# Patient Record
Sex: FEMALE | Race: ASIAN | NOT HISPANIC OR LATINO | Employment: FULL TIME | URBAN - METROPOLITAN AREA
[De-identification: names, ages, dates, MRNs, and addresses within clinical notes are randomized per-mention and may not be internally consistent; named-entity substitution may affect disease eponyms.]

---

## 2022-08-15 DIAGNOSIS — Z32.01 POSITIVE PREGNANCY TEST: Primary | ICD-10-CM

## 2022-12-07 ENCOUNTER — APPOINTMENT (EMERGENCY)
Dept: RADIOLOGY | Facility: HOSPITAL | Age: 32
End: 2022-12-07

## 2022-12-07 ENCOUNTER — HOSPITAL ENCOUNTER (EMERGENCY)
Facility: HOSPITAL | Age: 32
Discharge: HOME | End: 2022-12-07
Attending: EMERGENCY MEDICINE

## 2022-12-07 VITALS
SYSTOLIC BLOOD PRESSURE: 99 MMHG | DIASTOLIC BLOOD PRESSURE: 65 MMHG | RESPIRATION RATE: 16 BRPM | HEART RATE: 72 BPM | TEMPERATURE: 98 F | OXYGEN SATURATION: 99 %

## 2022-12-07 DIAGNOSIS — K52.9 COLITIS: ICD-10-CM

## 2022-12-07 DIAGNOSIS — R93.89 INCREASED ENDOMETRIAL STRIPE THICKNESS: ICD-10-CM

## 2022-12-07 DIAGNOSIS — R10.9 ABDOMINAL PAIN: Primary | ICD-10-CM

## 2022-12-07 LAB
ALBUMIN SERPL BCP-MCNC: 3.8 G/DL (ref 3.5–5)
ALP SERPL-CCNC: 49 U/L (ref 46–116)
ALT SERPL W P-5'-P-CCNC: 16 U/L (ref 12–78)
ANION GAP SERPL CALCULATED.3IONS-SCNC: 6 MMOL/L (ref 4–13)
AST SERPL W P-5'-P-CCNC: 16 U/L (ref 5–45)
BACTERIA UR QL AUTO: ABNORMAL /HPF
BASOPHILS # BLD AUTO: 0.02 THOUSANDS/ÂΜL (ref 0–0.1)
BASOPHILS NFR BLD AUTO: 0 % (ref 0–1)
BILIRUB SERPL-MCNC: 0.81 MG/DL (ref 0.2–1)
BILIRUB UR QL STRIP: NEGATIVE
BUN SERPL-MCNC: 16 MG/DL (ref 5–25)
CALCIUM SERPL-MCNC: 8.9 MG/DL (ref 8.3–10.1)
CHLORIDE SERPL-SCNC: 109 MMOL/L (ref 96–108)
CLARITY UR: CLEAR
CO2 SERPL-SCNC: 23 MMOL/L (ref 21–32)
COLOR UR: YELLOW
CREAT SERPL-MCNC: 0.71 MG/DL (ref 0.6–1.3)
EOSINOPHIL # BLD AUTO: 0.04 THOUSAND/ÂΜL (ref 0–0.61)
EOSINOPHIL NFR BLD AUTO: 0 % (ref 0–6)
ERYTHROCYTE [DISTWIDTH] IN BLOOD BY AUTOMATED COUNT: 12.1 % (ref 11.6–15.1)
EXT PREGNANCY TEST URINE: NEGATIVE
EXT. CONTROL: NORMAL
GFR SERPL CREATININE-BSD FRML MDRD: 113 ML/MIN/1.73SQ M
GLUCOSE SERPL-MCNC: 95 MG/DL (ref 65–140)
GLUCOSE UR STRIP-MCNC: NEGATIVE MG/DL
HCT VFR BLD AUTO: 40.7 % (ref 34.8–46.1)
HGB BLD-MCNC: 13.5 G/DL (ref 11.5–15.4)
HGB UR QL STRIP.AUTO: ABNORMAL
IMM GRANULOCYTES # BLD AUTO: 0.05 THOUSAND/UL (ref 0–0.2)
IMM GRANULOCYTES NFR BLD AUTO: 0 % (ref 0–2)
KETONES UR STRIP-MCNC: ABNORMAL MG/DL
LEUKOCYTE ESTERASE UR QL STRIP: NEGATIVE
LIPASE SERPL-CCNC: 78 U/L (ref 73–393)
LYMPHOCYTES # BLD AUTO: 1.18 THOUSANDS/ÂΜL (ref 0.6–4.47)
LYMPHOCYTES NFR BLD AUTO: 10 % (ref 14–44)
MCH RBC QN AUTO: 30 PG (ref 26.8–34.3)
MCHC RBC AUTO-ENTMCNC: 33.2 G/DL (ref 31.4–37.4)
MCV RBC AUTO: 90 FL (ref 82–98)
MONOCYTES # BLD AUTO: 1.28 THOUSAND/ÂΜL (ref 0.17–1.22)
MONOCYTES NFR BLD AUTO: 11 % (ref 4–12)
MUCOUS THREADS UR QL AUTO: ABNORMAL
NEUTROPHILS # BLD AUTO: 9.38 THOUSANDS/ÂΜL (ref 1.85–7.62)
NEUTS SEG NFR BLD AUTO: 79 % (ref 43–75)
NITRITE UR QL STRIP: NEGATIVE
NON-SQ EPI CELLS URNS QL MICRO: ABNORMAL /HPF
NRBC BLD AUTO-RTO: 0 /100 WBCS
PH UR STRIP.AUTO: 5.5 [PH]
PLATELET # BLD AUTO: 191 THOUSANDS/UL (ref 149–390)
PMV BLD AUTO: 9.6 FL (ref 8.9–12.7)
POTASSIUM SERPL-SCNC: 3.4 MMOL/L (ref 3.5–5.3)
PROT SERPL-MCNC: 7.9 G/DL (ref 6.4–8.4)
PROT UR STRIP-MCNC: ABNORMAL MG/DL
RBC # BLD AUTO: 4.5 MILLION/UL (ref 3.81–5.12)
RBC #/AREA URNS AUTO: ABNORMAL /HPF
SODIUM SERPL-SCNC: 138 MMOL/L (ref 135–147)
SP GR UR STRIP.AUTO: 1.03 (ref 1–1.03)
UROBILINOGEN UR STRIP-ACNC: <2 MG/DL
WBC # BLD AUTO: 11.95 THOUSAND/UL (ref 4.31–10.16)
WBC #/AREA URNS AUTO: ABNORMAL /HPF

## 2022-12-07 RX ORDER — KETOROLAC TROMETHAMINE 30 MG/ML
15 INJECTION, SOLUTION INTRAMUSCULAR; INTRAVENOUS ONCE
Status: COMPLETED | OUTPATIENT
Start: 2022-12-07 | End: 2022-12-07

## 2022-12-07 RX ORDER — OXYCODONE HYDROCHLORIDE 5 MG/1
5 TABLET ORAL EVERY 6 HOURS PRN
Qty: 5 TABLET | Refills: 0 | Status: SHIPPED | OUTPATIENT
Start: 2022-12-07 | End: 2022-12-17

## 2022-12-07 RX ORDER — MORPHINE SULFATE 4 MG/ML
4 INJECTION, SOLUTION INTRAMUSCULAR; INTRAVENOUS ONCE
Status: DISCONTINUED | OUTPATIENT
Start: 2022-12-07 | End: 2022-12-07

## 2022-12-07 RX ORDER — ACETAMINOPHEN 325 MG/1
975 TABLET ORAL ONCE
Status: DISCONTINUED | OUTPATIENT
Start: 2022-12-07 | End: 2022-12-07

## 2022-12-07 RX ADMIN — IOHEXOL 85 ML: 350 INJECTION, SOLUTION INTRAVENOUS at 06:22

## 2022-12-07 RX ADMIN — KETOROLAC TROMETHAMINE 15 MG: 30 INJECTION, SOLUTION INTRAMUSCULAR at 05:47

## 2022-12-07 RX ADMIN — SODIUM CHLORIDE, SODIUM LACTATE, POTASSIUM CHLORIDE, AND CALCIUM CHLORIDE 1000 ML: .6; .31; .03; .02 INJECTION, SOLUTION INTRAVENOUS at 05:37

## 2022-12-07 NOTE — Clinical Note
Ashutosh Shin was seen and treated in our emergency department on 12/7/2022  No restrictions        No limitations    Diagnosis: Colitis    Reather Seed  may return to work on return date  She may return on this date: 12/12/2022         If you have any questions or concerns, please don't hesitate to call        Chel Vazquez MD    ______________________________           _______________          _______________  [facility] [place] Representative                              Date                                Time

## 2022-12-07 NOTE — Clinical Note
Ashutosh Shin was seen and treated in our emergency department on 12/7/2022  No restrictions        No limitations    Diagnosis: Colitis    Reather Seed  may return to work on return date  She may return on this date: 12/12/2022         If you have any questions or concerns, please don't hesitate to call        Chel Vazquez MD    ______________________________           _______________          _______________  [facility] [place] Representative                              Date                                Time

## 2022-12-07 NOTE — ED PROVIDER NOTES
History  Chief Complaint   Patient presents with   • Abdominal Pain     Pt started with RLQ pain around midnight, denies any N/V/D     78-year-old woman with no past medical history presents with right lower quadrant abdominal pain  Symptoms started 28 hours ago and have progressively worsened  This evening, patient was unable to get any sleep  She denies nausea, vomiting, chest pain, dyspnea, dysuria, or diarrhea  Last menstrual period was a few weeks ago and regular  Patient has never had abdominal surgery  No personal or family history of inflammatory bowel disease  Past medical history: None  Past surgical history: None  Medications: None          None       History reviewed  No pertinent past medical history  History reviewed  No pertinent surgical history  History reviewed  No pertinent family history  I have reviewed and agree with the history as documented  E-Cigarette/Vaping     E-Cigarette/Vaping Substances           Review of Systems   Constitutional: Negative for chills and fever  HENT: Negative for ear pain and sore throat  Eyes: Negative for pain and visual disturbance  Respiratory: Negative for cough, shortness of breath and wheezing  Cardiovascular: Negative for chest pain and palpitations  Gastrointestinal: Positive for abdominal pain  Negative for constipation, diarrhea and vomiting  Genitourinary: Negative for dysuria, frequency, hematuria and vaginal bleeding  Musculoskeletal: Negative for arthralgias and back pain  Skin: Negative for color change and rash  Neurological: Negative for seizures, syncope and headaches  Psychiatric/Behavioral: Negative for agitation and confusion         Physical Exam  ED Triage Vitals   Temperature Pulse Respirations Blood Pressure SpO2   12/07/22 0519 12/07/22 0518 12/07/22 0518 12/07/22 0518 12/07/22 0518   98 °F (36 7 °C) 97 18 109/60 98 %      Temp Source Heart Rate Source Patient Position - Orthostatic VS BP Location FiO2 (%)   12/07/22 0519 12/07/22 0518 12/07/22 0700 12/07/22 0700 --   Oral Monitor Sitting Right arm       Pain Score       12/07/22 0547       5             Orthostatic Vital Signs  Vitals:    12/07/22 0518 12/07/22 0700   BP: 109/60 99/65   Pulse: 97 72   Patient Position - Orthostatic VS:  Sitting       Physical Exam  Vitals and nursing note reviewed  Constitutional:       General: She is not in acute distress  Appearance: Normal appearance  She is well-developed  HENT:      Head: Normocephalic and atraumatic  Right Ear: External ear normal       Left Ear: External ear normal       Nose: Nose normal  No congestion  Cardiovascular:      Rate and Rhythm: Normal rate and regular rhythm  Pulmonary:      Effort: Pulmonary effort is normal  No respiratory distress  Breath sounds: Normal breath sounds  Abdominal:      Palpations: Abdomen is soft  Tenderness: There is abdominal tenderness in the right lower quadrant  There is rebound  There is no guarding  Musculoskeletal:         General: Normal range of motion  Cervical back: Normal range of motion and neck supple  Skin:     General: Skin is warm and dry  Neurological:      General: No focal deficit present  Mental Status: She is alert and oriented to person, place, and time  Sensory: No sensory deficit  Motor: No weakness     Psychiatric:         Mood and Affect: Mood normal          Behavior: Behavior normal          ED Medications  Medications   lactated ringers bolus 1,000 mL (0 mL Intravenous Stopped 12/7/22 0637)   ketorolac (TORADOL) injection 15 mg (15 mg Intravenous Given 12/7/22 0547)   iohexol (OMNIPAQUE) 350 MG/ML injection (SINGLE-DOSE) 85 mL (85 mL Intravenous Given 12/7/22 0622)       Diagnostic Studies  Results Reviewed     Procedure Component Value Units Date/Time    Urine Microscopic [[id number]]  (Abnormal) Collected: 12/07/22 0612    Lab Status: Final result Specimen: Urine, Clean Catch Updated: 12/07/22 0743     RBC, UA 1-2 /hpf      WBC, UA 1-2 /hpf      Epithelial Cells Occasional /hpf      Bacteria, UA Moderate /hpf      MUCUS THREADS Innumerable    UA w Reflex to Microscopic w Reflex to Culture [[id number]]  (Abnormal) Collected: 12/07/22 0612    Lab Status: Final result Specimen: Urine, Clean Catch Updated: 12/07/22 0736     Color, UA Yellow     Clarity, UA Clear     Specific Gravity, UA 1 031     pH, UA 5 5     Leukocytes, UA Negative     Nitrite, UA Negative     Protein, UA Trace mg/dl      Glucose, UA Negative mg/dl      Ketones, UA 60 (2+) mg/dl      Urobilinogen, UA <2 0 mg/dl      Bilirubin, UA Negative     Occult Blood, UA Trace    Comprehensive metabolic panel [[id number]]  (Abnormal) Collected: 12/07/22 0538    Lab Status: Final result Specimen: Blood from Arm, Left Updated: 12/07/22 0612     Sodium 138 mmol/L      Potassium 3 4 mmol/L      Chloride 109 mmol/L      CO2 23 mmol/L      ANION GAP 6 mmol/L      BUN 16 mg/dL      Creatinine 0 71 mg/dL      Glucose 95 mg/dL      Calcium 8 9 mg/dL      AST 16 U/L      ALT 16 U/L      Alkaline Phosphatase 49 U/L      Total Protein 7 9 g/dL      Albumin 3 8 g/dL      Total Bilirubin 0 81 mg/dL      eGFR 113 ml/min/1 73sq m     Narrative:      [place] guidelines for Chronic Kidney Disease (CKD):   •  Stage 1 with normal or high GFR (GFR > 90 mL/min/1 73 square meters)  •  Stage 2 Mild CKD (GFR = 60-89 mL/min/1 73 square meters)  •  Stage 3A Moderate CKD (GFR = 45-59 mL/min/1 73 square meters)  •  Stage 3B Moderate CKD (GFR = 30-44 mL/min/1 73 square meters)  •  Stage 4 Severe CKD (GFR = 15-29 mL/min/1 73 square meters)  •  Stage 5 End Stage CKD (GFR <15 mL/min/1 73 square meters)  Note: GFR calculation is accurate only with a steady state creatinine    Lipase [[id number]]  (Normal) Collected: 12/07/22 0538    Lab Status: Final result Specimen: Blood from Arm, Left Updated: 12/07/22 0612     Lipase 78 u/L     CBC and differential [[id number]]  (Abnormal) Collected: 12/07/22 0538    Lab Status: Final result Specimen: Blood from Arm, Left Updated: 12/07/22 0551     WBC 11 95 Thousand/uL      RBC 4 50 Million/uL      Hemoglobin 13 5 g/dL      Hematocrit 40 7 %      MCV 90 fL      MCH 30 0 pg      MCHC 33 2 g/dL      RDW 12 1 %      MPV 9 6 fL      Platelets 552 Thousands/uL      nRBC 0 /100 WBCs      Neutrophils Relative 79 %      Immat GRANS % 0 %      Lymphocytes Relative 10 %      Monocytes Relative 11 %      Eosinophils Relative 0 %      Basophils Relative 0 %      Neutrophils Absolute 9 38 Thousands/µL      Immature Grans Absolute 0 05 Thousand/uL      Lymphocytes Absolute 1 18 Thousands/µL      Monocytes Absolute 1 28 Thousand/µL      Eosinophils Absolute 0 04 Thousand/µL      Basophils Absolute 0 02 Thousands/µL     POCT pregnancy, urine [[id number]]  (Normal) Resulted: 12/07/22 0548    Lab Status: Final result Updated: 12/07/22 0549     EXT Preg Test, Ur Negative     Control Valid                 CT abdomen pelvis with contrast   Final Result by Georgia Walker MD (12/07 4940)      Extensive thickening and inflammatory changes involving the ascending colon through the hepatic flexure, likely representing infectious or inflammatory colitis, although a diverticulum in the ascending colon raises the of lesser possibility of    diverticulitis  No perforation, abscess or obstruction  Appendix is normally visualized  Abnormal endometrial thickening noted  Recommend follow-up nonemergent ultrasound  The study was marked in [facility] for immediate notification and follow-up  Workstation performed: [id number]               Procedures  Procedures      ED Course         SBIRT 22yo+    Flowsheet Row Most Recent Value   SBIRT (23 yo +)    In order to provide better care to our patients, we are screening all of our patients for alcohol and drug use  Would it be okay to ask you these screening questions?  Yes Filed at: 12/07/2022 1714 Initial Alcohol Screen: US AUDIT-C     1  How often do you have a drink containing alcohol? 2 Filed at: 12/07/2022 0526   2  How many drinks containing alcohol do you have on a typical day you are drinking? 0 Filed at: 12/07/2022 0526   3a  Male UNDER 65: How often do you have five or more drinks on one occasion? 0 Filed at: 12/07/2022 0526   3b  FEMALE Any Age, or MALE 65+: How often do you have 4 or more drinks on one occassion? 0 Filed at: 12/07/2022 0526   Audit-C Score 2 Filed at: 12/07/2022 9546   HAR[place]: How many times in the past year have you    Used an illegal drug or used a prescription medication for non-medical reasons? Never Filed at: 12/07/2022 7457                MDM  Number of Diagnoses or Management Options  Abdominal pain  Colitis  Increased endometrial stripe thickness  Diagnosis management comments: Presents with 1 day of worsening right lower abdominal pain without vomiting or diarrhea  CT scan suggestive of infectious vs inflammatory colitis without evidence of appendicitis  Patient able to tolerate PO and reported improvement of her abdominal pain prior to discharge  Discussed with on-call GI who recommend outpatient stool studies and follow up outpatient  Informed patient of increased endometrial stripe thickness and need to follow up with OBGYN  Oxycodone sent to pharmacy  Patient in agreement with plan and questions were answered  Verbalized understanding of return precautions  Portions or all of this note were generated using voice recognition software  Occasional wrong word or "sound a like" substitutions may have occurred due to the inherent limitations of voice recognition software  Please interpret any errors within the intended context of the whole sentence or idea        Disposition  Final diagnoses:   Abdominal pain   Colitis   Increased endometrial stripe thickness     Time reflects when diagnosis was documented in both MDM as applicable and the Disposition within this note     Time User Action Codes Description Comment    12/7/2022  7:10 AM Kee Gobble Add [R10 9] Abdominal pain     12/7/2022  7:12 AM Laverda Ped Add [K52 9] Colitis     12/7/2022  7:16 AM Laverda Ped Add [R93 89] Increased endometrial stripe thickness       ED Disposition     ED Disposition   Discharge    Condition   Stable    Date/Time   Wed Dec 7, 2022  7:12 AM    Comment   Casi Blum discharge to home/self care  Follow-up Information     Follow up With Specialties Details Why Contact Info Additional [facility]   [address] [facility] [address] [place] [id number] [phone] Darinel Roberto [facility], [address] [place] [address] [address] [place] , [place], [place], [address]    [facility]st[facility] Gyne[facility]   [address]  [phone] [facility], [address] [place] [address], [id number] [phone]          Discharge Medication List as of 12/7/2022  7:26 AM      START taking these medications    Details   oxyCODONE (Roxicodone) 5 immediate release tablet Take 1 tablet (5 mg total) by mouth every 6 (six) hours as needed for severe pain for up to 10 days Max Daily Amount: 20 mg, Starting Wed 12/7/2022, Until Sat 12/17/2022 at 2359, Normal           Outpatient Discharge Orders   Stool Enteric Bacterial Panel by PCR   Standing Status: Future Standing Exp  Date: 12/07/23       PDMP Review     None           ED Provider  Attending physically available and evaluated Casi Blum I managed the patient along with the ED Attending      Electronically Signed by         Giulia Soto MD  12/07/22 2431

## 2022-12-07 NOTE — ED ATTENDING ATTESTATION
2022  IVitaly MD, saw and evaluated the patient  I have discussed the patient with the resident/non-physician practitioner and agree with the resident's/non-physician practitioner's findings, Plan of Care, and MDM as documented in the resident's/non-physician practitioner's note, except where noted  All available labs and Radiology studies were reviewed  I was present for key portions of any procedure(s) performed by the resident/non-physician practitioner and I was immediately available to provide assistance  At this point I agree with the current assessment done in the Emergency Department  I have conducted an independent evaluation of this patient a history and physical is as follows:    ED Course  ED Course as of 22 0650   Wed Dec 07, 2022   0529 Per resident h&p with R sided abdominal pain over the last 24 hours O: RLQ tender I/P UA; urine hCG; CT scan abdomen and pelvis     Emergency Department Note- Golden Wan 33 y o  female MRN: [id number]    Unit/Bed#: ED 24 Encounter: [id number]    Golden Wan is a 33 y o  female who presents with   Chief Complaint   Patient presents with   • Abdominal Pain     Pt started with RLQ pain around midnight, denies any N/V/D         History of Present Illness   HPI:  Golden Wan is a 33 y o  female who presents for evaluation of:  Right lower quadrant abdominal pain that started abruptly at midnight and has persisted until arrival in the emergency department  Pain is an aching sensation extending from her right lower quadrant of her abdomen to her right upper quadrant  The pain is an aching sensation, moderate to severe in intensity, made worse by movement, not palliated by rest   The patient has no history of prior surgeries  She is a  0 para 0; her last menstrual period was sometime last month  She denies associated nausea and vomiting  She also denies any associated diarrhea and hematochezia      Review of Systems Constitutional: Negative for fatigue and fever  HENT: Negative for congestion and sore throat  Respiratory: Negative for cough and shortness of breath  Cardiovascular: Negative for chest pain and palpitations  Gastrointestinal: Positive for abdominal pain  Negative for blood in stool and nausea  Genitourinary: Negative for flank pain and frequency  Neurological: Negative for light-headedness and headaches  Psychiatric/Behavioral: Negative for dysphoric mood and hallucinations  All other systems reviewed and are negative  Historical Information   History reviewed  No pertinent past medical history  History reviewed  No pertinent surgical history  Social History   Social History     Substance and Sexual Activity   Alcohol Use None     Social History     Substance and Sexual Activity   Drug Use Not on file     Social History     Tobacco Use   Smoking Status Not on file   Smokeless Tobacco Not on file     Family History: History reviewed  No pertinent family history  Meds/Allergies   PTA meds:   None     No Known Allergies    Objective   First Vitals:   Blood Pressure: 109/60 (12/07/22 0518)  Pulse: 97 (12/07/22 0518)  Temperature: 98 °F (36 7 °C) (12/07/22 0519)  Temp Source: Oral (12/07/22 0519)  Respirations: 18 (12/07/22 0518)  SpO2: 98 % (12/07/22 0518)    Current Vitals:   Blood Pressure: 109/60 (12/07/22 0518)  Pulse: 97 (12/07/22 0518)  Temperature: 98 °F (36 7 °C) (12/07/22 0519)  Temp Source: Oral (12/07/22 0519)  Respirations: 18 (12/07/22 0518)  SpO2: 98 % (12/07/22 0518)    No intake or output data in the 24 hours ending 12/07/22 0650    Invasive Devices     Peripheral Intravenous Line  Duration           Peripheral IV 12/07/22 Left Antecubital <1 day                Physical Exam  Vitals and nursing note reviewed  Constitutional:       General: She is not in acute distress  Appearance: Normal appearance  She is well-developed     HENT:      Head: Normocephalic and atraumatic  Right Ear: External ear normal       Left Ear: External ear normal       Nose: Nose normal       Mouth/Throat:      Pharynx: No oropharyngeal exudate  Eyes:      Conjunctiva/sclera: Conjunctivae normal       Pupils: Pupils are equal, round, and reactive to light  Cardiovascular:      Rate and Rhythm: Normal rate and regular rhythm  Pulmonary:      Effort: Pulmonary effort is normal  No respiratory distress  Abdominal:      General: Abdomen is flat  There is no distension  Palpations: Abdomen is soft  Tenderness: There is abdominal tenderness in the right upper quadrant and right lower quadrant  Musculoskeletal:         General: No deformity  Normal range of motion  Cervical back: Normal range of motion and neck supple  Skin:     General: Skin is warm and dry  Capillary Refill: Capillary refill takes less than 2 seconds  Neurological:      General: No focal deficit present  Mental Status: She is alert and oriented to person, place, and time  Mental status is at baseline  Coordination: Coordination normal    Psychiatric:         Mood and Affect: Mood normal          Behavior: Behavior normal          Thought Content: Thought content normal          Judgment: Judgment normal            Medical Decision Makin    Acute abdominal pain: CT scan abdomen and pelvis; CBC; CMP; urinalysis; urine for hCG    Recent Results (from the past 36 hour(s))   CBC and differential    Collection Time: 22  5:38 AM   Result Value Ref Range    WBC 11 95 (H) 4 31 - 10 16 Thousand/uL    RBC 4 50 3 81 - 5 12 Million/uL    Hemoglobin 13 5 11 5 - 15 4 g/dL    Hematocrit 40 7 34 8 - 46 1 %    MCV 90 82 - 98 fL    MCH 30 0 26 8 - 34 3 pg    MCHC 33 2 31 4 - 37 4 g/dL    RDW 12 1 11 6 - 15 1 %    MPV 9 6 8 9 - 12 7 fL    Platelets 011 352 - 864 Thousands/uL    nRBC 0 /100 WBCs    Neutrophils Relative 79 (H) 43 - 75 %    Immat GRANS % 0 0 - 2 %    Lymphocytes Relative 10 (L) 14 - 44 %    Monocytes Relative 11 4 - 12 %    Eosinophils Relative 0 0 - 6 %    Basophils Relative 0 0 - 1 %    Neutrophils Absolute 9 38 (H) 1 85 - 7 62 Thousands/µL    Immature Grans Absolute 0 05 0 00 - 0 20 Thousand/uL    Lymphocytes Absolute 1 18 0 60 - 4 47 Thousands/µL    Monocytes Absolute 1 28 (H) 0 17 - 1 22 Thousand/µL    Eosinophils Absolute 0 04 0 00 - 0 61 Thousand/µL    Basophils Absolute 0 02 0 00 - 0 10 Thousands/µL   Comprehensive metabolic panel    Collection Time: 12/07/22  5:38 AM   Result Value Ref Range    Sodium 138 135 - 147 mmol/L    Potassium 3 4 (L) 3 5 - 5 3 mmol/L    Chloride 109 (H) 96 - 108 mmol/L    CO2 23 21 - 32 mmol/L    ANION GAP 6 4 - 13 mmol/L    BUN 16 5 - 25 mg/dL    Creatinine 0 71 0 60 - 1 30 mg/dL    Glucose 95 65 - 140 mg/dL    Calcium 8 9 8 3 - 10 1 mg/dL    AST 16 5 - 45 U/L    ALT 16 12 - 78 U/L    Alkaline Phosphatase 49 46 - 116 U/L    Total Protein 7 9 6 4 - 8 4 g/dL    Albumin 3 8 3 5 - 5 0 g/dL    Total Bilirubin 0 81 0 20 - 1 00 mg/dL    eGFR 113 ml/min/1 73sq m   Lipase    Collection Time: 12/07/22  5:38 AM   Result Value Ref Range    Lipase 78 73 - 393 u/L   POCT pregnancy, urine    Collection Time: 12/07/22  5:48 AM   Result Value Ref Range    EXT Preg Test, Ur Negative     Control Valid      CT abdomen pelvis with contrast   Final Result      Extensive thickening and inflammatory changes involving the ascending colon through the hepatic flexure, likely representing infectious or inflammatory colitis, although a diverticulum in the ascending colon raises the of lesser possibility of    diverticulitis  No perforation, abscess or obstruction  Appendix is normally visualized  Abnormal endometrial thickening noted  Recommend follow-up nonemergent ultrasound  The study was marked in [facility] for immediate notification and follow-up        Workstation performed: [id number]               Portions of the record may have been created with voice recognition software  Occasional wrong word or "sound a like" substitutions may have occurred due to the inherent limitations of voice recognition software  Read the chart carefully and recognize, using context, where substitutions have occurred            Critical Care Time  Procedures

## 2022-12-07 NOTE — DISCHARGE INSTRUCTIONS
You were seen for lower abdominal pain  You have infectious or inflammatory colitis  You can take 650 mg Tylenol and 400 mg Advil every 6 hours for pain/fever  If you need additional pain medication, please take the prescribed pain medicine  You should follow up with gastroenterology outside the hospital  Collect a stool sample for the stool studies

## 2023-01-16 NOTE — PROGRESS NOTES
Assessment   33 y o  No obstetric history on file  presenting for annual exam      Plan   Diagnoses and all orders for this visit:    Well woman exam with routine gynecological exam    Increased endometrial stripe thickness  -     Ambulatory Referral to Obstetrics / Gynecology  -     US pelvis complete w transvaginal; Future        Pap collected today  Will start attempting to conceive in the next few months  Discussed starting a prenatal vitamin, cycle tracking and timing intercourse with ovulatory window  Markedly increased endometrial stripe on CT- had CT for abdominal pain in early December  CT showed markedly thick endometrium at 29 mm  CT was performed day prior to her menses started  She was referred for follow up imaging  Will place order for TVUS to follow up on endometrial lining  Advised to complete this on day 7-10 of next menses  SBE encouraged, A yearly mammogram is recommended for breast cancer screening starting at age 36  ASCCP guidelines reviewed  Advised to call with any issues, all concerns & questions addressed  See provided information in your after visit summary     F/U Annually and PRN    Results will be released to [facility], if abnormal will call or message to review and discuss treatment plan      __________________________________________________________________    Subjective     Araceli Vizcaino is a 33 y o  No obstetric history on file  presenting for annual exam  She is without complaints  Her  is an anesthesiologist with the network  They are hoping to try for pregnancy soon  Hoping to start trying in the next few months  Has not yet started a prenatal vitamin  She was referred to the office today after incidental finding of markedly thickened endometrium on CT  Had CT of abdomen and pelvis in early December for abdominal pain in the ED  Endometrial lining noted to be 29 mm at that time  She was due for her menses and did get her menses the following day   She was advised to follow up with a GYN for follow up imaging studies    SCREENING  Last Pap: unknown; per pt in her early 25s in [facility] are regular every 28-30 days, lasting 7 days  Dysmenorrhea:mild, occurring first 1-2 days of flow  Sexually active: Yes - single partner - male  Contraception: none  Hx STI: denies     Hx Abnormal pap: denies  We reviewed ASCCP guidelines for Pap testing today  Gardasil: She has completed the Gardasil series  Denies vaginal discharge, itching, odor, dyspareunia, pelvic pain and vulvar/vaginal symptoms      OB          Complaints: denies   Denies urgency, frequency, hematuria, leakage / change in stream, difficulty urinating  BREAST  Complaints: denies   Denies: breast lump, breast tenderness, nipple discharge, skin color change, and skin lesion(s)      Pertinent Family Hx:   Family hx of breast cancer: no  Family hx of ovarian cancer: no  Family hx of colon cancer: no      GENERAL  PMH reviewed/updated and is as below  SOCIAL  Smoking: no  Alcohol:no  Drug: social  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      No past medical history on file  No past surgical history on file  No current outpatient medications on file      No Known Allergies    Social History     Socioeconomic History   • Marital status: /Civil Union     Spouse name: Not on file   • Number of children: Not on file   • Years of education: Not on file   • Highest education level: Not on file   Occupational History   • Not on file   Tobacco Use   • Smoking status: Not on file   • Smokeless tobacco: Not on file   Substance and Sexual Activity   • Alcohol use: Not on file   • Drug use: Not on file   • Sexual activity: Not on file   Other Topics Concern   • Not on file   Social History Narrative   • Not on file     Social Determinants of Health     Financial Resource Strain: Not on file   Food Insecurity: Not on file   Transportation Needs: Not on file   Physical Activity: Not on file   Stress: Not on file   Social Connections: Not on file   Intimate Partner Violence: Not on file   Housing Stability: Not on file       Review of Systems     ROS:  Constitutional: Negative for fatigue and unexpected weight change  Respiratory: Negative for cough and shortness of breath  Cardiovascular: Negative for chest pain and palpitations  Gastrointestinal: Negative for abdominal pain and change in bowel habits  Breasts:  Negative, other than as noted above  Genitourinary: Negative, other than as noted above  Psychiatric: Negative for mood difficulties  Objective         Vitals:    01/17/23 0859   BP: 100/70       Physical Examination:    Patient appears well and is not in distress  Neck is supple without masses, no cervical or supraclavicular lymphadenopathy  Cardiovascular: regular rate and rhythm; no murmurs  Lungs: clear to auscultation bilaterally; no wheezes  Breasts are symmetrical without mass, tenderness, nipple discharge, skin changes or adenopathy  Abdomen is soft and nontender without masses  External genitals are normal without lesions or rashes  Urethral meatus and urethra are normal  Bladder is normal to palpation  Vagina is normal without discharge or bleeding  Cervix is normal without discharge or lesion  Uterus is normal, mobile, nontender without palpable mass  Adnexa are normal, nontender, without palpable mass

## 2023-01-17 ENCOUNTER — OFFICE VISIT (OUTPATIENT)
Dept: OBGYN CLINIC | Facility: CLINIC | Age: 33
End: 2023-01-17

## 2023-01-17 VITALS
DIASTOLIC BLOOD PRESSURE: 70 MMHG | WEIGHT: 97.8 LBS | BODY MASS INDEX: 21.1 KG/M2 | SYSTOLIC BLOOD PRESSURE: 100 MMHG | HEIGHT: 57 IN

## 2023-01-17 DIAGNOSIS — R93.89 INCREASED ENDOMETRIAL STRIPE THICKNESS: ICD-10-CM

## 2023-01-17 DIAGNOSIS — Z01.419 WELL WOMAN EXAM WITH ROUTINE GYNECOLOGICAL EXAM: Primary | ICD-10-CM

## 2023-01-18 LAB
HPV HR 12 DNA CVX QL NAA+PROBE: NEGATIVE
HPV16 DNA CVX QL NAA+PROBE: NEGATIVE
HPV18 DNA CVX QL NAA+PROBE: NEGATIVE

## 2023-01-24 LAB
LAB AP GYN PRIMARY INTERPRETATION: NORMAL
LAB AP LMP: NORMAL
Lab: NORMAL

## 2023-02-17 ENCOUNTER — HOSPITAL ENCOUNTER (OUTPATIENT)
Dept: RADIOLOGY | Facility: HOSPITAL | Age: 33
Discharge: HOME | End: 2023-02-17

## 2023-02-17 DIAGNOSIS — R93.89 INCREASED ENDOMETRIAL STRIPE THICKNESS: ICD-10-CM

## 2023-09-12 ENCOUNTER — TRANSCRIBE ORDERS (OUTPATIENT)
Dept: LAB | Facility: CLINIC | Age: 33
End: 2023-09-12

## 2023-09-12 ENCOUNTER — APPOINTMENT (OUTPATIENT)
Dept: LAB | Facility: CLINIC | Age: 33
End: 2023-09-12
Payer: COMMERCIAL

## 2023-09-12 DIAGNOSIS — Z13.220 SCREENING CHOLESTEROL LEVEL: ICD-10-CM

## 2023-09-12 DIAGNOSIS — Z00.00 HEALTHCARE MAINTENANCE: ICD-10-CM

## 2023-09-12 DIAGNOSIS — Z13.1 DIABETES MELLITUS SCREENING: ICD-10-CM

## 2023-09-12 DIAGNOSIS — Z13.220 SCREENING CHOLESTEROL LEVEL: Primary | ICD-10-CM

## 2023-09-12 DIAGNOSIS — Z82.0 FAMILY HISTORY OF ALZHEIMER'S DISEASE: ICD-10-CM

## 2023-09-12 LAB
ALBUMIN SERPL BCP-MCNC: 4.7 G/DL (ref 3.5–5)
ALP SERPL-CCNC: 42 U/L (ref 34–104)
ALT SERPL W P-5'-P-CCNC: 10 U/L (ref 7–52)
ANION GAP SERPL CALCULATED.3IONS-SCNC: 6 MMOL/L
AST SERPL W P-5'-P-CCNC: 17 U/L (ref 13–39)
BILIRUB SERPL-MCNC: 0.62 MG/DL (ref 0.2–1)
BUN SERPL-MCNC: 15 MG/DL (ref 5–25)
CALCIUM SERPL-MCNC: 9.4 MG/DL (ref 8.4–10.2)
CHLORIDE SERPL-SCNC: 105 MMOL/L (ref 96–108)
CHOLEST SERPL-MCNC: 215 MG/DL
CO2 SERPL-SCNC: 29 MMOL/L (ref 21–32)
CREAT SERPL-MCNC: 0.8 MG/DL (ref 0.6–1.3)
ERYTHROCYTE [DISTWIDTH] IN BLOOD BY AUTOMATED COUNT: 12 % (ref 11.6–15.1)
EST. AVERAGE GLUCOSE BLD GHB EST-MCNC: 103 MG/DL
GFR SERPL CREATININE-BSD FRML MDRD: 97 ML/MIN/1.73SQ M
GLUCOSE P FAST SERPL-MCNC: 91 MG/DL (ref 65–99)
HBA1C MFR BLD: 5.2 %
HCT VFR BLD AUTO: 46 % (ref 34.8–46.1)
HDLC SERPL-MCNC: 65 MG/DL
HGB BLD-MCNC: 15.6 G/DL (ref 11.5–15.4)
LDLC SERPL CALC-MCNC: 129 MG/DL (ref 0–100)
MCH RBC QN AUTO: 30.7 PG (ref 26.8–34.3)
MCHC RBC AUTO-ENTMCNC: 33.9 G/DL (ref 31.4–37.4)
MCV RBC AUTO: 91 FL (ref 82–98)
PLATELET # BLD AUTO: 254 THOUSANDS/UL (ref 149–390)
PMV BLD AUTO: 9.5 FL (ref 8.9–12.7)
POTASSIUM SERPL-SCNC: 4.4 MMOL/L (ref 3.5–5.3)
PROT SERPL-MCNC: 7.3 G/DL (ref 6.4–8.4)
RBC # BLD AUTO: 5.08 MILLION/UL (ref 3.81–5.12)
SODIUM SERPL-SCNC: 140 MMOL/L (ref 135–147)
TRIGL SERPL-MCNC: 103 MG/DL
WBC # BLD AUTO: 5.61 THOUSAND/UL (ref 4.31–10.16)

## 2023-09-12 PROCEDURE — 36415 COLL VENOUS BLD VENIPUNCTURE: CPT

## 2023-09-12 PROCEDURE — 83036 HEMOGLOBIN GLYCOSYLATED A1C: CPT

## 2023-09-12 PROCEDURE — 80061 LIPID PANEL: CPT

## 2023-09-12 PROCEDURE — 83695 ASSAY OF LIPOPROTEIN(A): CPT

## 2023-09-12 PROCEDURE — 80053 COMPREHEN METABOLIC PANEL: CPT

## 2023-09-12 PROCEDURE — 85027 COMPLETE CBC AUTOMATED: CPT

## 2023-09-12 PROCEDURE — 81401 MOPATH PROCEDURE LEVEL 2: CPT

## 2023-09-12 PROCEDURE — 82172 ASSAY OF APOLIPOPROTEIN: CPT

## 2023-09-13 LAB
APO B SERPL-MCNC: 106 MG/DL
LPA SERPL-SCNC: 125.7 NMOL/L

## 2023-09-18 LAB — MISCELLANEOUS LAB TEST RESULT: NORMAL

## 2024-03-21 ENCOUNTER — INITIAL PRENATAL (OUTPATIENT)
Dept: OBGYN CLINIC | Facility: CLINIC | Age: 34
End: 2024-03-21
Payer: COMMERCIAL

## 2024-03-21 VITALS — BODY MASS INDEX: 20.99 KG/M2 | WEIGHT: 97 LBS | SYSTOLIC BLOOD PRESSURE: 102 MMHG | DIASTOLIC BLOOD PRESSURE: 60 MMHG

## 2024-03-21 DIAGNOSIS — Z34.90 EARLY STAGE OF PREGNANCY: Primary | ICD-10-CM

## 2024-03-21 DIAGNOSIS — O26.899 PREGNANCY RELATED NAUSEA, ANTEPARTUM: ICD-10-CM

## 2024-03-21 DIAGNOSIS — N91.1 AMENORRHEA, SECONDARY: ICD-10-CM

## 2024-03-21 DIAGNOSIS — R11.0 PREGNANCY RELATED NAUSEA, ANTEPARTUM: ICD-10-CM

## 2024-03-21 PROCEDURE — 99213 OFFICE O/P EST LOW 20 MIN: CPT | Performed by: STUDENT IN AN ORGANIZED HEALTH CARE EDUCATION/TRAINING PROGRAM

## 2024-03-21 PROCEDURE — 76817 TRANSVAGINAL US OBSTETRIC: CPT | Performed by: STUDENT IN AN ORGANIZED HEALTH CARE EDUCATION/TRAINING PROGRAM

## 2024-03-21 NOTE — ASSESSMENT & PLAN NOTE
34yo  at 9.4wks by LMP consistent with BSUS performed today, JERED 10/20/24, presents for dating US     Pt denies pelvic cramping or vaginal bleeding.     -continue PNVs   -bleeding precautions provided   -return for M US and initial prenatal visit

## 2024-03-21 NOTE — PROGRESS NOTES
Information obtained from chart review, not directly discussed with patient. Will readdress an initial prenatal visit.

## 2024-03-21 NOTE — PROGRESS NOTES
Ultrasound Probe Disinfection    A transvaginal ultrasound was performed.   Prior to use, disinfection was performed with High Level Disinfection Process ([employer]).  Probe serial number.Probe serial number: [id number]    Early stage of pregnancy  32yo  at 9.4wks by LMP consistent with BSUS performed today, JERED 10/20/24, presents for dating US     Pt denies pelvic cramping or vaginal bleeding.     -continue PNVs   -bleeding precautions provided   -return for M US and initial prenatal visit     Pregnancy related nausea, antepartum  -recommend vit B6 and unisom     ROS: denies headaches, changes in vision, chest pain, palpitations, shortness of breath, nausea, vomiting, fevers, chills     PE:  General: alert and oriented, resting comfortably, no acute distress  Cardiac: regular rate  Pulmonary: no respiratory distress  Abdomen: soft, nondistended, no rebound or guarding, nontender   : external vulva without lesions, redness, tenderness   Extremities: no edema or calf tenderness bilaterally

## 2024-03-22 DIAGNOSIS — Z34.01 ENCOUNTER FOR SUPERVISION OF NORMAL FIRST PREGNANCY IN FIRST TRIMESTER: Primary | ICD-10-CM

## 2024-03-28 ENCOUNTER — INITIAL PRENATAL (OUTPATIENT)
Dept: OBGYN CLINIC | Facility: CLINIC | Age: 34
End: 2024-03-28

## 2024-03-28 DIAGNOSIS — Z31.430 ENCOUNTER OF FEMALE FOR TESTING FOR GENETIC DISEASE CARRIER STATUS FOR PROCREATIVE MANAGEMENT: ICD-10-CM

## 2024-03-28 DIAGNOSIS — Z36.9 UNSPECIFIED ANTENATAL SCREENING: ICD-10-CM

## 2024-03-28 DIAGNOSIS — Z34.01 ENCOUNTER FOR SUPERVISION OF NORMAL FIRST PREGNANCY IN FIRST TRIMESTER: Primary | ICD-10-CM

## 2024-03-28 PROCEDURE — OBC

## 2024-03-28 NOTE — PATIENT INSTRUCTIONS
Congratulations!! Please review our Pregnancy Essential Guide and [facility] L&D Virtual tour from our networks website.     [facility] Pregnancy Essentials Guide  [facility] ([email or web address])     [facility] - Virtual Tour ([email or web address])        Pregnancy at 11 to 14 Weeks   AMBULATORY CARE:   Changes happening to your body:  You are now at the end of your first trimester and entering your second trimester. Morning sickness usually goes away by this time. You may have other symptoms such as fatigue, frequent urination, and headaches. You may have gained 2 to 4 pounds by now.  Seek care immediately if:   You have pain or cramping in your abdomen or low back.    You have heavy vaginal bleeding or clotting.    You pass material that looks like tissue or large clots. Collect the material and bring it with you.    Call your doctor or obstetrician if:   You cannot keep food or drinks down, and you are losing weight.    You have light vaginal bleeding.    You have chills or a fever.    You have vaginal itching, burning, or pain.    You have yellow, green, white, or foul-smelling vaginal discharge.    You have pain or burning when you urinate, less urine than usual, or pink or bloody urine.    You have questions or concerns about your condition or care.    How to care for yourself at this stage of your pregnancy:       Get plenty of rest.  You may feel more tired than normal. You may need to take naps or go to bed earlier.    Manage nausea and vomiting.  Avoid fatty and spicy foods. Eat small meals throughout the day instead of large meals. Luanne may help to decrease nausea. Ask your healthcare provider about other ways of decreasing nausea and vomiting.    Eat a variety of healthy foods.  Healthy foods include fruits, vegetables, whole-grain breads, low-fat dairy foods, beans, lean meats, and fish. Drink liquids as directed. Ask how much liquid to drink each day and which liquids are best for you.  Limit caffeine to less than 200 milligrams each day. Limit your intake of fish to 2 servings each week. Choose fish low in mercury such as canned light tuna, shrimp, salmon, cod, or tilapia. Do not  eat fish high in mercury such as swordfish, tilefish, idalia mackerel, and shark.         Take prenatal vitamins as directed.  Your need for certain vitamins and minerals, such as folic acid, increases during pregnancy. Prenatal vitamins provide some of the extra vitamins and minerals you need. Prenatal vitamins may also help to decrease the risk of certain birth defects.         Do not smoke.  Smoking increases your risk of a miscarriage and other health problems during your pregnancy. Smoking can cause your baby to be born too early or weigh less at birth. Ask your healthcare provider for information if you need help quitting.    Do not drink alcohol.  Alcohol passes from your body to your baby through the placenta. It can affect your baby's brain development and cause fetal alcohol syndrome (FAS). FAS is a group of conditions that causes mental, behavior, and growth problems.    Talk to your healthcare provider before you take any medicines.  Many medicines may harm your baby if you take them when you are pregnant. Do not take any medicines, vitamins, herbs, or supplements without first talking to your healthcare provider. Never use illegal or street drugs (such as marijuana or cocaine) while you are pregnant.  Safety tips during pregnancy:   Avoid hot tubs and saunas.  Do not use a hot tub or sauna while you are pregnant, especially during your first trimester. Hot tubs and saunas may raise your baby's temperature and increase the risk of birth defects.    Avoid toxoplasmosis.  This is an infection caused by eating raw meat or being around infected cat feces. It can cause birth defects, miscarriages, and other problems. Wash your hands after you touch raw meat. Make sure any meat is well-cooked before you eat it. Avoid  raw eggs and unpasteurized milk. Use gloves or ask someone else to clean your cat's litter box while you are pregnant.    Changes happening with your baby:  Your baby has fully formed fingernails and toenails. Your baby's heartbeat can now be heard. Ask your healthcare provider if you can listen to your baby's heartbeat. By week 14, your baby is over 4 inches long from the top of the head to the rump (baby's bottom). Your baby weighs over 3 ounces.  Prenatal care:  Prenatal care is a series of visits with your healthcare provider throughout your pregnancy. During the first 28 weeks of your pregnancy, you will see your healthcare provider 1 time each month. Prenatal care can help prevent problems during pregnancy and childbirth. Your healthcare provider will check your blood pressure and weight. Your baby's heart rate will also be checked. You may also need the following at some visits:  A pelvic exam  allows your healthcare provider to see your cervix (the bottom part of your uterus). Your healthcare provider will use a speculum to open your vagina. He or she will check the size and shape of your uterus.    Blood tests  may be done to check for any of the following:    Gestational diabetes or anemia (low iron level)    Blood type or Rh factor, or certain birth defects    Immunity to certain diseases, such as chickenpox or rubella    An infection, such as a sexually transmitted infection, HIV, or hepatitis B    Hepatitis B  may need to be prevented or treated. Hepatitis B is inflammation of the liver caused by the hepatitis B virus (HBV). HBV can spread from a mother to her baby during delivery. You will be checked for HBV as early as possible in the first trimester of each pregnancy. You need the test even if you received the hepatitis B vaccine or were tested before. You may need to have an HBV infection treated before you give birth.    Urine tests  may also be done to check for sugar and protein. These can be  signs of gestational diabetes or preeclampsia. Urine tests may also be done to check for signs of infection.    A fetal ultrasound  shows pictures of your baby inside your uterus. The pictures are used to check your baby's development, movement, and position.         Genetic disorder screening tests  may be offered to you. These tests check your baby's risk for genetic disorders such as Down syndrome. A screening test includes a blood test and ultrasound.    Follow up with your doctor or obstetrician as directed:  Go to all prenatal visits. Write down your questions so you remember to ask them during your visits.  © Copyright [employer] 2023 Information is for End User's use only and may not be sold, redistributed or otherwise used for commercial purposes.  The above information is an  only. It is not intended as medical advice for individual conditions or treatments. Talk to your doctor, nurse or pharmacist before following any medical regimen to see if it is safe and effective for you.

## 2024-03-28 NOTE — PROGRESS NOTES
OB INTAKE INTERVIEW  Patient is 33 y.o.y.o. who presents for OB intake at 10-4 wks  She is accompanied by self during this encounter  The father of her baby (Mitchel) is involved in the pregnancy and  they are .       Last Menstrual Period: 24  Ultrasound: Measured 9 weeks 4 days on 3/21/24  Estimated Date of Delivery: 10/20/24 via LMP & US     Signs/Symptoms of Pregnancy  Current pregnancy symptoms: fatigue, nausea- B 6 & Unisom  Constipation no  Headaches no  Cramping/spotting no  PICA cravings no    Diabetes-    If patient has 1 or more, please order early 1 hour GTT  History of GDM no  BMI >35 no  History of PCOS or current metformin use no  History of LGA/macrosomic infant (4000g/9lbs) no    If patient has 2 or more, please order early 1 hour GTT  BMI>30 no  AMA no  First degree relative with type 2 diabetes no  History of chronic HTN, hyperlipidemia, elevated A1C no  High risk race (, , ,  or ) YES    Hypertension- if you answer yes to any of the following, please order baseline preeclampsia labs (cbc, comprehensive metabolic panel, urine protein creatinine ratio, uric acid)  History of of chronic HTN no  History of gestational HTN no  History of preeclampsia, eclampsia, or HELLP syndrome no  History of diabetes no  History of lupus, autoimmune disease, kidney disease no    Thyroid- if yes order TSH with reflex T4  History of thyroid disease no    Bleeding Disorder or Hx of DVT-patient or first degree relative with history of. Order the following if not done previously.   (Factor V, antithrombin III, prothrombin gene mutation, protein C and S Ag, lupus anticoagulant, anticardiolipin, beta-2 glycoprotein)   no    OB/GYN-  History of abnormal pap smear no       Date of last pap smear 23 wnl  History of HPV no  History of Herpes/HSV no  History of other STI (gonorrhea, chlamydia, trich) no  History of prior  no  History of prior   no  History of  delivery prior to 36 weeks 6 days no  History of blood transfusion no  Ok for blood transfusion yes    Substance screening- if yes outside of tobacco for her or anyone in her home-order urine drug screen  History of tobacco use no  Currently using tobacco no  Currently using alcohol no  Presently using drugs no  Past drug use  no  IV drug use- no  Partner drug use no  Parent/Family drug use no    MRSA Screening-   Does the pt have a hx of MRSA? no    Immunizations:  Influenza vaccine given this season no  Discussed Tdap vaccine yes  Discussed COVID Vaccine yes    Genetic/MFM-  Do you or your partner have a history of any of the following in yourselves or first degree relatives?  Cystic fibrosis no  Spinal muscular atrophy no  Hemoglobinopathy/Sickle Cell/Thalassemia no  Fragile X Intellectual Disability no    If yes, discuss Carrier Screening and recommend consultation with [facility]/Genetic Counseling and place specific [facility] Referral for.    If no, discuss Carrier Screening being completed once in a lifetime as a standard of care lab test. Place orders for Cystic Fibrosis Gene Test ([id number]) and Spinal Muscular Atrophy DNA ([id number])      Appointment for Nuchal Translucency Ultrasound at [facility] scheduled for 24    Interview education  [facility] Pregnancy Essentials Book reviewed, discussed and attached to their AVS yes    Nurse/Family Partnership- patient may qualify no; referral placed no    Prenatal lab work scripts yes  Extra labs ordered:  CF, SMA    Aspirin/Preeclampsia Screen    Risk Level Risk Factor Recommendation   LOW Prior Uncomplicated full-term delivery no No Aspirin recommendation        MODERATE Nulliparity YES Recommend low-dose aspirin if     BMI>30 no 2 or more moderate risk factors    Family History Preeclampsia (mother/sister) no     35yr old or greater no      or Low Socioeconomic no     IVF Pregnancy  no     Personal History Risks (low birth weight,  prior adverse preg outcome, >10yr preg interval) no         HIGH History of Preeclampsia no Recommend low-dose aspirin if     Multifetal gestation no 1 or more high risk factors    Chronic HTN no     Type 1 or 2 Diabetes no     Renal Disease no     Autoimmune Disease  no      Contraindications to ASA therapy:  NSAID/ ASA allergy: no  Nasal polyps: no  Asthma with history of ASA induced bronchospasm: no  Relative contraindications:  History of GI bleed: no  Active peptic ulcer disease: no  Severe hepatic dysfunction: no    Patient should be recommended to take ASA 162mg during this pregnancy from 12-36wks to lower her risk of preeclampsia: no      The patient has a history now or in prior pregnancy notable for: ,  ER visit  for pelvic pain-  pt was told Colitis, but has no symptoms since.         Details that I feel the provider should be aware of: Pt requesting CF & SMA testing.  Pt's , Mitchel is employed as an Anesthesiologist at [employer].      PN1 visit scheduled. The patient was oriented to our practice, reviewed delivering physicians and [facility] for Delivery. All questions were answered. PN in-person intake completed.  Pt & her , Mitchel,  Happy with pregnancy.  Pn bldwk, including CF & SMA ordered in epic.  Pt to await authorization p.c. prior to having bldwk drawn.  Pt has an appt scheduled for , PNC; & , PN1.  Advised pt to call with any further questions/concerns.       Interviewed by: Aleja Jackson RN, CLC

## 2024-04-11 ENCOUNTER — APPOINTMENT (OUTPATIENT)
Dept: LAB | Facility: CLINIC | Age: 34
End: 2024-04-11
Payer: COMMERCIAL

## 2024-04-11 DIAGNOSIS — Z36.9 UNSPECIFIED ANTENATAL SCREENING: ICD-10-CM

## 2024-04-11 DIAGNOSIS — Z31.430 ENCOUNTER OF FEMALE FOR TESTING FOR GENETIC DISEASE CARRIER STATUS FOR PROCREATIVE MANAGEMENT: ICD-10-CM

## 2024-04-11 DIAGNOSIS — Z34.01 ENCOUNTER FOR SUPERVISION OF NORMAL FIRST PREGNANCY IN FIRST TRIMESTER: ICD-10-CM

## 2024-04-11 LAB
ABO GROUP BLD: NORMAL
BACTERIA UR QL AUTO: ABNORMAL /HPF
BASOPHILS # BLD AUTO: 0.02 THOUSANDS/ÂΜL (ref 0–0.1)
BASOPHILS NFR BLD AUTO: 0 % (ref 0–1)
BILIRUB UR QL STRIP: NEGATIVE
BLD GP AB SCN SERPL QL: NEGATIVE
CLARITY UR: CLEAR
COLOR UR: ABNORMAL
EOSINOPHIL # BLD AUTO: 0.06 THOUSAND/ÂΜL (ref 0–0.61)
EOSINOPHIL NFR BLD AUTO: 1 % (ref 0–6)
ERYTHROCYTE [DISTWIDTH] IN BLOOD BY AUTOMATED COUNT: 12 % (ref 11.6–15.1)
GLUCOSE UR STRIP-MCNC: NEGATIVE MG/DL
HCT VFR BLD AUTO: 40.2 % (ref 34.8–46.1)
HGB BLD-MCNC: 14.1 G/DL (ref 11.5–15.4)
HGB UR QL STRIP.AUTO: NEGATIVE
IMM GRANULOCYTES # BLD AUTO: 0.02 THOUSAND/UL (ref 0–0.2)
IMM GRANULOCYTES NFR BLD AUTO: 0 % (ref 0–2)
KETONES UR STRIP-MCNC: NEGATIVE MG/DL
LEUKOCYTE ESTERASE UR QL STRIP: ABNORMAL
LYMPHOCYTES # BLD AUTO: 1.37 THOUSANDS/ÂΜL (ref 0.6–4.47)
LYMPHOCYTES NFR BLD AUTO: 17 % (ref 14–44)
MCH RBC QN AUTO: 31.1 PG (ref 26.8–34.3)
MCHC RBC AUTO-ENTMCNC: 35.1 G/DL (ref 31.4–37.4)
MCV RBC AUTO: 89 FL (ref 82–98)
MONOCYTES # BLD AUTO: 0.57 THOUSAND/ÂΜL (ref 0.17–1.22)
MONOCYTES NFR BLD AUTO: 7 % (ref 4–12)
MUCOUS THREADS UR QL AUTO: ABNORMAL
NEUTROPHILS # BLD AUTO: 6.27 THOUSANDS/ÂΜL (ref 1.85–7.62)
NEUTS SEG NFR BLD AUTO: 75 % (ref 43–75)
NITRITE UR QL STRIP: NEGATIVE
NON-SQ EPI CELLS URNS QL MICRO: ABNORMAL /HPF
NRBC BLD AUTO-RTO: 0 /100 WBCS
PH UR STRIP.AUTO: 6 [PH]
PLATELET # BLD AUTO: 233 THOUSANDS/UL (ref 149–390)
PMV BLD AUTO: 9.2 FL (ref 8.9–12.7)
PROT UR STRIP-MCNC: ABNORMAL MG/DL
RBC # BLD AUTO: 4.54 MILLION/UL (ref 3.81–5.12)
RBC #/AREA URNS AUTO: ABNORMAL /HPF
RH BLD: POSITIVE
RUBV IGG SERPL IA-ACNC: 28.7 IU/ML
SP GR UR STRIP.AUTO: 1.02 (ref 1–1.03)
UROBILINOGEN UR STRIP-ACNC: <2 MG/DL
WBC # BLD AUTO: 8.31 THOUSAND/UL (ref 4.31–10.16)
WBC #/AREA URNS AUTO: ABNORMAL /HPF

## 2024-04-11 PROCEDURE — 86762 RUBELLA ANTIBODY: CPT

## 2024-04-11 PROCEDURE — 86780 TREPONEMA PALLIDUM: CPT

## 2024-04-11 PROCEDURE — 86900 BLOOD TYPING SEROLOGIC ABO: CPT

## 2024-04-11 PROCEDURE — 86803 HEPATITIS C AB TEST: CPT

## 2024-04-11 PROCEDURE — 87086 URINE CULTURE/COLONY COUNT: CPT

## 2024-04-11 PROCEDURE — 87340 HEPATITIS B SURFACE AG IA: CPT

## 2024-04-11 PROCEDURE — 81001 URINALYSIS AUTO W/SCOPE: CPT

## 2024-04-11 PROCEDURE — 81329 SMN1 GENE DOS/DELETION ALYS: CPT

## 2024-04-11 PROCEDURE — 87389 HIV-1 AG W/HIV-1&-2 AB AG IA: CPT

## 2024-04-11 PROCEDURE — 86850 RBC ANTIBODY SCREEN: CPT

## 2024-04-11 PROCEDURE — 81220 CFTR GENE COM VARIANTS: CPT

## 2024-04-11 PROCEDURE — 85025 COMPLETE CBC W/AUTO DIFF WBC: CPT

## 2024-04-11 PROCEDURE — 86901 BLOOD TYPING SEROLOGIC RH(D): CPT

## 2024-04-11 PROCEDURE — 36415 COLL VENOUS BLD VENIPUNCTURE: CPT

## 2024-04-12 LAB
BACTERIA UR CULT: NORMAL
HBV SURFACE AG SER QL: NORMAL
HCV AB SER QL: NORMAL
HIV 1+2 AB+HIV1 P24 AG SERPL QL IA: NORMAL
HIV 2 AB SERPL QL IA: NORMAL
HIV1 AB SERPL QL IA: NORMAL
HIV1 P24 AG SERPL QL IA: NORMAL
TREPONEMA PALLIDUM IGG+IGM AB [PRESENCE] IN SERUM OR PLASMA BY IMMUNOASSAY: NORMAL

## 2024-04-16 ENCOUNTER — ROUTINE PRENATAL (OUTPATIENT)
Dept: PERINATAL CARE | Facility: CLINIC | Age: 34
End: 2024-04-16
Payer: COMMERCIAL

## 2024-04-16 VITALS
BODY MASS INDEX: 19.32 KG/M2 | HEART RATE: 94 BPM | SYSTOLIC BLOOD PRESSURE: 90 MMHG | DIASTOLIC BLOOD PRESSURE: 50 MMHG | HEIGHT: 60 IN | WEIGHT: 98.4 LBS

## 2024-04-16 DIAGNOSIS — N91.1 AMENORRHEA, SECONDARY: ICD-10-CM

## 2024-04-16 DIAGNOSIS — Z36.82 NUCHAL TRANSLUCENCY OF FETUS ON PRENATAL ULTRASOUND: Primary | ICD-10-CM

## 2024-04-16 DIAGNOSIS — Z3A.13 13 WEEKS GESTATION OF PREGNANCY: ICD-10-CM

## 2024-04-16 LAB
CITATION REF LAB TEST: NORMAL
CLINICAL INFO: NORMAL
ETHNIC BACKGROUND STATED: NORMAL
GENE DIS ANL CARRIER INTERP-IMP: NORMAL
GENE MUT TESTED BLD/T: NORMAL
LAB DIRECTOR NAME PROVIDER: NORMAL
REASON FOR REFERRAL (NARRATIVE): NORMAL
RECOMMENDATION PATIENT DOC-IMP: NORMAL
REF LAB TEST METHOD: NORMAL
SERVICE CMNT-IMP: NORMAL
SMN1 GENE MUT ANL BLD/T: NORMAL
SPECIMEN SOURCE: NORMAL

## 2024-04-16 PROCEDURE — 99203 OFFICE O/P NEW LOW 30 MIN: CPT | Performed by: OBSTETRICS & GYNECOLOGY

## 2024-04-16 PROCEDURE — 76813 OB US NUCHAL MEAS 1 GEST: CPT | Performed by: OBSTETRICS & GYNECOLOGY

## 2024-04-16 NOTE — LETTER
2024     Kim Rudd DO  [address] [facility] [id number]    Patient: Va Lujan   YOB: 1990   Date of Visit: 2024       Dear Dr. Rudd:    Thank you for referring Va Lujan to me for evaluation. Below are my notes for this consultation.    If you have questions, please do not hesitate to call me. I look forward to following your patient along with you.         Sincerely,        Shadia Tellez MD        CC: No Recipients    Shadia Tellez MD  2024  8:07 PM  Sign when Signing Visit  OFFICE CONSULT  Referring physician:   Kim Rudd Do  [address] [place] [id number]      Dear Dr. Rudd      Thank you for requesting a  consultation on your patient Ms. Va Lujan for the following indications:  Genetic screening    History  Va is on prenatal vitamins and has no known drug allergies.  She reports no significant medical history.  Surgical history includes wisdom tooth extraction.  She denies any substance use.  This is her first pregnancy.  Her mother and father have hypertension.  She denies any family history of preeclampsia.    Ultrasound findings: The ultrasound shows a fetus concordant with dates. The nasal bone and nuchal translucency appears normal. No malformations are seen on today's early ultrasound.     The patient was informed of the findings and counseled about the limitations of the exam in detecting all forms of fetal congenital abnormalities.    She does not report any vaginal bleeding or uterine cramping or contractions.      Specific counseling was provided on the following problems:  We discussed the options for genetic screening which include invasive testing on the fetal placenta or on fetal skin cells within the amniotic fluid and compared this to noninvasive testing which includes cell free DNA screening.  We reviewed the risks, the benefits and the limitations of each.   In the end patient chose to complete the cell free DNA screen tomorrow when she has more time.         Future tests recommended:  The results of her NIPT will return in 7-10 days.  Screening for spina bifida with an MSAFP screen is a future test that can be prescribed through her OB office.  This blood work should be drawn preferably at 16 to 18 weeks so that the results return prior to her next scan.  The test though can be run until 21 weeks and 6 days if needed.    Future ultrasounds ordered today:   Fetal Level II ultrasound imaging is recommended at 19-20 weeks' gestation.    Pre visit time reviewing her records   5 minutes  Face to face time 5 minutes  Post visit time on documentation of note, updating her problem list, adding orders and prescriptions 5 minutes.  Procedures that were completed today were charged separately.   The level of decision making was straight forward.    Shadia Tellez MD

## 2024-04-16 NOTE — PROGRESS NOTES
Dr. Tellez ordered NIPS testing today for Va however Va was unable to stay for blood draw due to work obligations. Va made a nurse visit appointment for tomorrow 04/17/24 at 0800 at the [facility] office.

## 2024-04-16 NOTE — PROGRESS NOTES
OFFICE CONSULT  Referring physician:   Kim Rudd Do  [address] [place] [address]      Dear Dr. Rudd      Thank you for requesting a  consultation on your patient Ms. Va Lujan for the following indications:  Genetic screening    History  Va is on prenatal vitamins and has no known drug allergies.  She reports no significant medical history.  Surgical history includes wisdom tooth extraction.  She denies any substance use.  This is her first pregnancy.  Her mother and father have hypertension.  She denies any family history of preeclampsia.    Ultrasound findings: The ultrasound shows a fetus concordant with dates. The nasal bone and nuchal translucency appears normal. No malformations are seen on today's early ultrasound.     The patient was informed of the findings and counseled about the limitations of the exam in detecting all forms of fetal congenital abnormalities.    She does not report any vaginal bleeding or uterine cramping or contractions.      Specific counseling was provided on the following problems:  We discussed the options for genetic screening which include invasive testing on the fetal placenta or on fetal skin cells within the amniotic fluid and compared this to noninvasive testing which includes cell free DNA screening.  We reviewed the risks, the benefits and the limitations of each.  In the end patient chose to complete the cell free DNA screen tomorrow when she has more time.         Future tests recommended:  The results of her NIPT will return in 7-10 days.  Screening for spina bifida with an MSAFP screen is a future test that can be prescribed through her OB office.  This blood work should be drawn preferably at 16 to 18 weeks so that the results return prior to her next scan.  The test though can be run until 21 weeks and 6 days if needed.    Future ultrasounds ordered today:   Fetal Level II ultrasound imaging is recommended at 19-20  weeks' gestation.    Pre visit time reviewing her records   5 minutes  Face to face time 5 minutes  Post visit time on documentation of note, updating her problem list, adding orders and prescriptions 5 minutes.  Procedures that were completed today were charged separately.   The level of decision making was straight forward.    Shadia Tellez MD

## 2024-04-17 ENCOUNTER — CLINICAL SUPPORT (OUTPATIENT)
Dept: PERINATAL CARE | Facility: CLINIC | Age: 34
End: 2024-04-17
Payer: COMMERCIAL

## 2024-04-17 DIAGNOSIS — Z36.9 UNSPECIFIED ANTENATAL SCREENING: ICD-10-CM

## 2024-04-17 DIAGNOSIS — Z36.0 ENCOUNTER FOR ANTENATAL SCREENING FOR CHROMOSOMAL ANOMALIES: ICD-10-CM

## 2024-04-17 DIAGNOSIS — Z33.1 PREGNANCY, INCIDENTAL: Primary | ICD-10-CM

## 2024-04-17 PROCEDURE — 36415 COLL VENOUS BLD VENIPUNCTURE: CPT | Performed by: OBSTETRICS & GYNECOLOGY

## 2024-04-17 NOTE — PROGRESS NOTES
Patient chose to have [facility] [id number] Non-Invasive Prenatal Screen [id number] PLUS w/ SCA, WITH fetal sex.  Patient choose to be billed through insurance.     Patient given brochure and is aware [facility] will contact patient's insurance and coordinate coverage.  Provided [facility] contact information. General inquiries [phone], Cost estimates [phone] and [facility] Billing [phone]. Website [email or web address].     Blood collection tubes labeled with patient identifiers (name, medical record number, and date of birth).     Filled out [facility] order form. Patient chose to have blood drawn in our office at time of visit. NIPS was drawn from right arm with a butterfly needle by SHELLI Matos RNC-OB .       If patient chose to have blood work drawn at a [facility] lab we requested patient notify MFM (via phone call or i-drive message) when blood collected so office can follow up on results.       Maternal Fetal Medicine will have results in approximately 5-7 business days and will call patient or notify via i-drive.  Patient aware viewing lab result online will reveal fetal sex if ordered.    Patient verbalized understanding of all instructions and no questions at this time.

## 2024-04-21 LAB
CFDNA.FET/CFDNA.TOTAL SFR FETUS: NORMAL %
CITATION REF LAB TEST: NORMAL
FET 13+18+21+X+Y ANEUP PLAS.CFDNA: NEGATIVE
FET CHR 21 TS PLAS.CFDNA QL: NEGATIVE
FET CHR 21 TS PLAS.CFDNA QL: NEGATIVE
FET MS X RISK WBC.DNA+CFDNA QL: NOT DETECTED
FET SEX PLAS.CFDNA DOSAGE CFDNA: NORMAL
FET TS 13 RISK PLAS.CFDNA QL: NEGATIVE
FET X + Y ANEUP RISK PLAS.CFDNA SEQ-IMP: NOT DETECTED
GA EST FROM CONCEPTION DATE: NORMAL D
GESTATIONAL AGE > 9:: YES
LAB DIRECTOR NAME PROVIDER: NORMAL
LAB DIRECTOR NAME PROVIDER: NORMAL
LABORATORY COMMENT REPORT: NORMAL
LIMITATIONS OF THE TEST: NORMAL
NEGATIVE PREDICTIVE VALUE: NORMAL
PERFORMANCE CHARACTERISTICS: NORMAL
POSITIVE PREDICTIVE VALUE: NORMAL
REF LAB TEST METHOD: NORMAL
SL AMB NOTE:: NORMAL
TEST PERFORMANCE INFO SPEC: NORMAL

## 2024-04-23 LAB
CFTR FULL MUT ANL BLD/T SEQ: NORMAL
CITATION REF LAB TEST: NORMAL
CLINICAL INFO: NORMAL
ETHNIC BACKGROUND STATED: NORMAL
GENE DIS ANL CARRIER INTERP-IMP: NORMAL
INDICATION: NORMAL
LAB DIRECTOR NAME PROVIDER: NORMAL
RECOMMENDATION PATIENT DOC-IMP: NORMAL
REF LAB TEST METHOD: NORMAL
SERVICE CMNT-IMP: NORMAL
SPECIMEN SOURCE: NORMAL

## 2024-04-23 NOTE — RESULT ENCOUNTER NOTE
Ms. Va Lujan   Your Cell free DNA screening returned as normal.  If you are interested in knowing what the baby's sex is, than you will need to open the lab result to see it.     Your obstetrician at your next OB visit should offer screening for spina bifida that can be completed between 16 and 18 weeks and utilizes the blood test called MSAFP. This lab is to see if your baby is at increased risk to have spinal defect.    Shadia Tellez MD

## 2024-04-25 ENCOUNTER — TELEPHONE (OUTPATIENT)
Age: 34
End: 2024-04-25

## 2024-04-25 NOTE — TELEPHONE ENCOUNTER
Per comm consent lm to pt with results and recommendations of NIPS from Dr Tellez. OB appointment tomorrow

## 2024-04-25 NOTE — TELEPHONE ENCOUNTER
----- Message from Shadia Tellez MD sent at 4/23/2024  5:06 PM EDT -----  Ms. Va Stevensonsylvain   Your Cell free DNA screening returned as normal.  If you are interested in knowing what the baby's sex is, than you will need to open the lab result to see it.     Your obstetrician at your next OB visit should offer screening for spina bifida that can be completed between 16 and 18 weeks and utilizes the blood test called MSAFP. This lab is to see if your baby is at increased risk to have spinal defect.    Shadia Tellez MD

## 2024-04-26 ENCOUNTER — INITIAL PRENATAL (OUTPATIENT)
Dept: OBGYN CLINIC | Facility: CLINIC | Age: 34
End: 2024-04-26

## 2024-04-26 VITALS
DIASTOLIC BLOOD PRESSURE: 60 MMHG | BODY MASS INDEX: 19.44 KG/M2 | SYSTOLIC BLOOD PRESSURE: 108 MMHG | HEIGHT: 60 IN | WEIGHT: 99 LBS

## 2024-04-26 DIAGNOSIS — Z3A.14 14 WEEKS GESTATION OF PREGNANCY: ICD-10-CM

## 2024-04-26 DIAGNOSIS — R11.0 PREGNANCY RELATED NAUSEA, ANTEPARTUM: ICD-10-CM

## 2024-04-26 DIAGNOSIS — Z34.01 ENCOUNTER FOR SUPERVISION OF NORMAL FIRST PREGNANCY IN FIRST TRIMESTER: Primary | ICD-10-CM

## 2024-04-26 DIAGNOSIS — Z36.9 UNSPECIFIED ANTENATAL SCREENING: ICD-10-CM

## 2024-04-26 DIAGNOSIS — O26.899 PREGNANCY RELATED NAUSEA, ANTEPARTUM: ICD-10-CM

## 2024-04-26 PROBLEM — Z34.90 EARLY STAGE OF PREGNANCY: Status: RESOLVED | Noted: 2024-03-21 | Resolved: 2024-04-26

## 2024-04-26 PROCEDURE — 87591 N.GONORRHOEAE DNA AMP PROB: CPT | Performed by: PHYSICIAN ASSISTANT

## 2024-04-26 PROCEDURE — PNV: Performed by: PHYSICIAN ASSISTANT

## 2024-04-26 PROCEDURE — 87491 CHLMYD TRACH DNA AMP PROBE: CPT | Performed by: PHYSICIAN ASSISTANT

## 2024-04-26 NOTE — PROGRESS NOTES
34 y.o.  at 14w5d with Estimated Date of Delivery: 10/20/24 by LMP 2024 consistent w/ 1st trimester US.     She denies nausea/vomiting and bleeding/cramping.     Prenatal labs: complete and wnl    Genetic screening: Completed NT with MFM. Low risk NIPT    OB history: none    GYN history: Last pap smear 2023 neg/neg.     History of STDs: none    Past medical history: none    Past surgical history: wisdom    Social history: Denies tobacco, alcohol, or illicit drug use.    Family history:   DVT/PE: none  Cancer: PGM  Heart disease: none  Stroke: none    Physical exam:     Fetal heart tones: 150 bpm    Patient appears well and is not in distress  Neck is supple without masses  Breasts are symmetrical without mass, tenderness, nipple discharge, skin changes or adenopathy.   Abdomen is soft and nontender without masses.   External genitals are normal without lesions or rashes.  Vagina is normal without discharge or bleeding.   Cervix is normal without discharge or lesion.   Uterus is normal for gestational age.   Adnexa are normal, nontender, without palpable mass.        Discussed as well during this visit was diet, prenatal vitamins, prenatal visits, lab testing, breast feeding, vaccinations, maternal fetal medicine consultations, and lifestyle.      ASSESSMENT/PLAN   Problem List Items Addressed This Visit    None      1 - Gonorrhea and Chlamydia cultures obtained today  2 - Pap smear with HPV co-testing - up to date  3 - Prenatal labs reviewed -- complete and wnl  4 - Genetic screening -- completed NT; low risk NIPT; AFP order given  5 - RTO in 4 weeks for routine PN visit    Blue packet given and reviewed     All questions were answered & Va expressed understanding.

## 2024-04-26 NOTE — PROGRESS NOTES
Patient presents for Pn1 visit.     LMP- 2024  JERED- 10/20/2024  GA- 14w5d    Last pap- 2023 -/-  GC Swab collected today.     Pn1 labs completed  MFM scheduled   Blue folder given at intake  No LOF, bleeding, cramping or discharge.   No questions or concerns for today's visit.

## 2024-04-26 NOTE — ASSESSMENT & PLAN NOTE
Taking PNV  Low risk NIPT  AFP ordered  Planning to travel to [place] and [place] for two weeks at 26 wks. Reviewed okay to travel at this time  Needs JERED note which was provided at check out  Advised to get travel insurance  Reviewed flying DVT/PE precautions- frequent ambulation and wear compression socks

## 2024-04-30 LAB
C TRACH DNA SPEC QL NAA+PROBE: NEGATIVE
N GONORRHOEA DNA SPEC QL NAA+PROBE: NEGATIVE

## 2024-05-02 ENCOUNTER — PATIENT MESSAGE (OUTPATIENT)
Dept: OBGYN CLINIC | Facility: CLINIC | Age: 34
End: 2024-05-02

## 2024-05-02 DIAGNOSIS — O99.891 BACK PAIN IN PREGNANCY: Primary | ICD-10-CM

## 2024-05-02 DIAGNOSIS — M54.9 BACK PAIN IN PREGNANCY: Primary | ICD-10-CM

## 2024-05-17 ENCOUNTER — EVALUATION (OUTPATIENT)
Dept: PHYSICAL THERAPY | Facility: REHABILITATION | Age: 34
End: 2024-05-17
Payer: COMMERCIAL

## 2024-05-17 ENCOUNTER — TELEPHONE (OUTPATIENT)
Age: 34
End: 2024-05-17

## 2024-05-17 DIAGNOSIS — M54.9 BACK PAIN IN PREGNANCY: Primary | ICD-10-CM

## 2024-05-17 DIAGNOSIS — O99.891 BACK PAIN IN PREGNANCY: ICD-10-CM

## 2024-05-17 DIAGNOSIS — M54.9 BACK PAIN IN PREGNANCY: ICD-10-CM

## 2024-05-17 DIAGNOSIS — O99.891 BACK PAIN IN PREGNANCY: Primary | ICD-10-CM

## 2024-05-17 PROCEDURE — 97162 PT EVAL MOD COMPLEX 30 MIN: CPT

## 2024-05-17 PROCEDURE — 97110 THERAPEUTIC EXERCISES: CPT

## 2024-05-17 NOTE — TELEPHONE ENCOUNTER
Physical therapy called into get a referral with more visits. The one used today only had one visit.

## 2024-05-17 NOTE — PROGRESS NOTES
PT Evaluation     Today's date: 2024  Patient name: Va Lujan  : 1990  MRN: [id number]  Referring provider: Piedad Kelly PT  Dx:   Encounter Diagnosis     ICD-10-CM    1. Back pain in pregnancy  O99.891 Ambulatory Referral to Physical Therapy    M54.9           Start Time: 1300  Stop Time: 1400  Total time in clinic (min): 60 minutes    Assessment    Assessment details: Va Lujan is a 34 y.o.  female with complaints of back pain.  Patient's presentation is consistent with pain during pregnancy with the following notable impairments found on evaluation: aberrant movement patterns, poor ergonomics during STS, bed transfer, car transfers, and hip weakness. These impairments contribute to the following functional limitations: decreased tolerance to extended walking, extended standing, exercise, lifting objects greater than 10 lbs, increases pt distress, impaired transfer ability, pain during occupation, and impairs QOL.  Va Lujan is a good candidate for physical therapy and would benefit from skilled physical therapy to guide progressive interventions to address the above impairments in order to allow the patient to achieve the goals listed and return to prior level of function. POC: breathing mechanics, functional strengthening, abdominal strengthening, ergonomic training, and labor and delivery training.    Patient also educated on diagnosis, plan of care and prognosis. Pt is in agreement with recommended plan of care and goals for therapy, and demonstrates motivation for active participation in proposed plan of care.      Goals  Function:   In 10 weeks, pt will report improvement in tolerance to sitting at desk for 3 hours or more w/ 0/10 to indicate improved tolerance to occupation.   7 weeks, patient will be able to perform proper bear down mechanics with breathing to indicate education of L and D mechanics.    Pain:   In 10 weeks, patient will report 0/10 midthoracic pain  with standing for 3 hours or more to indicate improved tolerance to daily activities.          Plan    Duration in weeks: 10  Plan of Care beginning date: 2024  Plan of Care expiration date: 2024        Subjective      Chief Complaint: Midthoracic Back Pin   HPI: Pt presents to PT 17 weeks gestation w/ ~ 1 month onset of mid thoracic pain. Pain is aggravated by extended sitting, standing, and laying on side.   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    Bowel:     Pt reports constipation. Denies straining and pain.    GYN:      - 17 Weeks pregnant; Due Date 10/20/2024    Sexual Function:     Currently sexually active? Yes  Sexual dysfunction? No    MSK:      Current exercise: 1x a week prenatal yoga   Pain:      Location: Mid back   Onset: 1 month  Description: Soreness  5/10 current; 0/10 at best; 5/10 at worst  Aggravating factors: Extended sitting, laying  Relieving factors: Walking   Goals:     Wants to get stronger   Increase endurance   Walking more   Decrease pain   Endurance   Labor and Delivery training          Objective           Precautions:   Patient Active Problem List   Diagnosis    Pregnancy related nausea, antepartum    14 weeks gestation of pregnancy       Diagnosis:    POC expires (Date that your POC expires) Auth Status? (BOMN, approved, pending) Unit limit (Daily) Auth Start date Expiration date PT/OT + Visit Limit?   2024  Pending          Date of Service         Visits Used 1        Visits Remaining           Date 5/17                 Weeks  17                 [facility] Created                           Neuro Re-Ed         Urge deferral         Breathing Mechanics         PFM Coordination         PFM Down Training          Internal Cueing                  Ther Ex         PFM Strengthening         Hip strengthening         Functional Strengthening         Abdominal Strengthening TA Contraction x20  **        Dilator Training         Aerobic         Therapeutic Rest Breaks         Ergonomic Training Box Lift x20     SA Rows - 7# x20     STS x10     Bed transfer training         Ther Activity         PFM Education         Voiding Diaries          Defecation Mechanics         Fluid Intake          Review of Sxs                  Manual Ther         PFM exam         Ortho exam Performed         PFM Manuals                           Modalities                           Patient Education                  Outcome Measure           Oswestry

## 2024-05-24 ENCOUNTER — ROUTINE PRENATAL (OUTPATIENT)
Dept: OBGYN CLINIC | Facility: CLINIC | Age: 34
End: 2024-05-24
Payer: COMMERCIAL

## 2024-05-24 VITALS
BODY MASS INDEX: 19.96 KG/M2 | HEART RATE: 107 BPM | SYSTOLIC BLOOD PRESSURE: 94 MMHG | DIASTOLIC BLOOD PRESSURE: 68 MMHG | WEIGHT: 102.2 LBS | OXYGEN SATURATION: 98 %

## 2024-05-24 DIAGNOSIS — Z3A.18 18 WEEKS GESTATION OF PREGNANCY: ICD-10-CM

## 2024-05-24 DIAGNOSIS — Z34.02 ENCOUNTER FOR SUPERVISION OF NORMAL FIRST PREGNANCY IN SECOND TRIMESTER: Primary | ICD-10-CM

## 2024-05-24 LAB
SL AMB  POCT GLUCOSE, UA: NORMAL
SL AMB POCT URINE PROTEIN: NORMAL

## 2024-05-24 PROCEDURE — PNV: Performed by: PHYSICIAN ASSISTANT

## 2024-05-24 PROCEDURE — 81002 URINALYSIS NONAUTO W/O SCOPE: CPT | Performed by: PHYSICIAN ASSISTANT

## 2024-05-24 NOTE — PROGRESS NOTES
18 weeks gestation of pregnancy  Va  is a 34 y.o.  @18w5d who presents for routine prenatal visit.    Denies OB complaints.     Has form for work requesting to be exempt from getting flu vaccine until flu season in the fall. Form signed    NIPT- low risk  MASFP- reminded to complete  Level II - scheduled    Good fetal movement.  Denies contractions, cramping, leakage of fluid or vaginal bleeding.     Reviewed PTL precautions and reasons to call.

## 2024-05-24 NOTE — PROGRESS NOTES
Patient here for prenatal visit.   GA: 18w5d    Denies leaking of fluid, bleeding, cramping  Not feeling fetal movement yet- maybe some fluttering  Labs-Utd; AFP active/not completed  NIPS - negative  No concerns at this time.

## 2024-05-24 NOTE — ASSESSMENT & PLAN NOTE
Va  is a 34 y.o.  @18w5d who presents for routine prenatal visit.    Denies OB complaints.     Has form for work requesting to be exempt from getting flu vaccine until flu season in the fall. Form signed    NIPT- low risk  MASFP- reminded to complete  Level II - scheduled    Good fetal movement.  Denies contractions, cramping, leakage of fluid or vaginal bleeding.     Reviewed PTL precautions and reasons to call.

## 2024-05-29 ENCOUNTER — TELEPHONE (OUTPATIENT)
Dept: OBGYN CLINIC | Facility: CLINIC | Age: 34
End: 2024-05-29

## 2024-05-29 DIAGNOSIS — Z33.1 NORMAL PREGNANCY, INCIDENTAL: Primary | ICD-10-CM

## 2024-05-30 ENCOUNTER — APPOINTMENT (OUTPATIENT)
Dept: LAB | Facility: CLINIC | Age: 34
End: 2024-05-30
Payer: COMMERCIAL

## 2024-05-30 DIAGNOSIS — Z33.1 NORMAL PREGNANCY, INCIDENTAL: ICD-10-CM

## 2024-05-30 DIAGNOSIS — Z36.9 UNSPECIFIED ANTENATAL SCREENING: ICD-10-CM

## 2024-05-30 PROCEDURE — 82105 ALPHA-FETOPROTEIN SERUM: CPT

## 2024-05-30 PROCEDURE — 36415 COLL VENOUS BLD VENIPUNCTURE: CPT

## 2024-05-31 ENCOUNTER — APPOINTMENT (OUTPATIENT)
Dept: PHYSICAL THERAPY | Facility: REHABILITATION | Age: 34
End: 2024-05-31
Payer: COMMERCIAL

## 2024-06-01 LAB
2ND TRIMESTER 4 SCREEN SERPL-IMP: NORMAL
AFP ADJ MOM SERPL: 1.19
AFP INTERP AMN-IMP: NORMAL
AFP INTERP SERPL-IMP: NORMAL
AFP INTERP SERPL-IMP: NORMAL
AFP SERPL-MCNC: 87.8 NG/ML
AGE AT DELIVERY: 33.4 YR
GA METHOD: NORMAL
GA: 19.6 WEEKS
IDDM PATIENT QL: NO
MULTIPLE PREGNANCY: NO
NEURAL TUBE DEFECT RISK FETUS: 6704 %

## 2024-06-06 NOTE — PROGRESS NOTES
Daily Note     Today's date: 2024  Patient name: Va Lujan  : 1990  MRN: [id number]  Referring provider: Piedad Kelly PT  Dx:   Encounter Diagnosis     ICD-10-CM    1. Back pain in pregnancy  O99.891     M54.9           Start Time: 0815  Stop Time: 0908  Total time in clinic (min): 53 minutes      Chief Complaint: Midthoracic Back Pin   HPI: Pt presents to PT 17 weeks gestation w/ ~ 1 month onset of mid thoracic pain. Pain is aggravated by extended sitting, standing, and laying on side.   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  Is now 20 weeks.  R side is more bothersome.    Pain before Tx: 6/10  Pain after Tx: better    Objective: See treatment diary below      Assessment: Tolerated treatment well. Patient would benefit from continued PT. Worked through different positions to coordinate breath and activation of PFM/TA. Also shown different stretches with the foam roll.  Performed fascia decompression to R mid thoracic area in s/l position which was very well tolerated.  Tx successful in decreasing symptoms.     Access Code: [email or web address]  URL: [email or web address]  Date: 2024  Prepared by: Jenny    Exercises  - Standing Single Arm Row with Resistance Thumb Up  - 1 x daily - 7 x weekly - 2 sets - 10 reps  - Seated Thoracic Extension with Hands Behind Neck  - 1 x daily - 7 x weekly - 3 sets - 10 reps  - Box Lift from the ground  - 1 x daily - 7 x weekly - 3 sets - 10 reps  - Sidelying Open Book Thoracic Lumbar Rotation and Extension  - 1 x daily - 7 x weekly - 2 sets - 10 reps - 5 hold  - Hip Hinge Rock Back  - 1 x daily - 7 x weekly - 2 sets - 10 reps - 5 hold  - Quadruped Thoracic Rotation - Reach Under  - 1 x daily - 7 x weekly - 2 sets - 10 reps - 5 hold  - Thoracic Mobilization with Foam Roll  - 1 x daily - 7 x weekly - 2 sets - 10 reps - 5 hold  -  Wall Quarter Squat with Foam Roller  - 1 x daily - 7 x weekly - 2 sets - 10 reps - 5 hold  - Lateral Thoracic Extensions with Foam Roll  - 1 x daily - 7 x weekly - 2 sets - 10 reps - 5 hold    Plan: Continue per plan of care.      Precautions:   Patient Active Problem List   Diagnosis    Pregnancy related nausea, antepartum    14 weeks gestation of pregnancy       Diagnosis:    POC expires (Date that your POC expires) Auth Status? (BOMN, approved, pending) Unit limit (Daily) Auth Start date Expiration date PT/OT + Visit Limit?   7/26/2024  Pending          Date of Service 5/17 6/7       Visits Used 1        Visits Remaining           Date 5/17                 Weeks  17 20                [employer] Created                           Neuro Re-Ed         Urge deferral         Breathing Mechanics         PFM Coordination         PFM Down Training          Internal Cueing                  Ther Ex         PFM Strengthening  Q-ped PFM 10x  Hip hinge 10x       Foam roll  Self mobilization 10'                Hip strengthening  Clamshells 2x10       Thread the needle  10x       Open book  10x       Functional Strengthening         Abdominal Strengthening TA Contraction x20  TA x 20  Bridge 10x       Dilator Training         Aerobic         Therapeutic Rest Breaks         Ergonomic Training Box Lift x20     SA Rows - 7# x20     STS x10     Bed transfer training  SA rows x20           Ther Activity         PFM Education         Voiding Diaries          Defecation Mechanics         Fluid Intake          Review of Sxs                  Manual Ther         PFM exam         Ortho exam Performed         PFM Manuals         Fascia decompression  R mid thoracic in s/l 8'                Modalities                           Patient Education                  Outcome Measure           Oswestry

## 2024-06-07 ENCOUNTER — OFFICE VISIT (OUTPATIENT)
Dept: PHYSICAL THERAPY | Facility: REHABILITATION | Age: 34
End: 2024-06-07
Payer: COMMERCIAL

## 2024-06-07 ENCOUNTER — ROUTINE PRENATAL (OUTPATIENT)
Dept: PERINATAL CARE | Facility: CLINIC | Age: 34
End: 2024-06-07
Payer: COMMERCIAL

## 2024-06-07 VITALS
WEIGHT: 104 LBS | HEIGHT: 60 IN | HEART RATE: 111 BPM | SYSTOLIC BLOOD PRESSURE: 92 MMHG | BODY MASS INDEX: 20.42 KG/M2 | DIASTOLIC BLOOD PRESSURE: 62 MMHG

## 2024-06-07 DIAGNOSIS — M54.9 BACK PAIN IN PREGNANCY: Primary | ICD-10-CM

## 2024-06-07 DIAGNOSIS — Z3A.20 20 WEEKS GESTATION OF PREGNANCY: Primary | ICD-10-CM

## 2024-06-07 DIAGNOSIS — Z36.89 ENCOUNTER FOR FETAL ANATOMIC SURVEY: ICD-10-CM

## 2024-06-07 DIAGNOSIS — O99.891 BACK PAIN IN PREGNANCY: Primary | ICD-10-CM

## 2024-06-07 DIAGNOSIS — Z36.86 ENCOUNTER FOR ANTENATAL SCREENING FOR CERVICAL LENGTH: ICD-10-CM

## 2024-06-07 PROCEDURE — 76805 OB US >/= 14 WKS SNGL FETUS: CPT | Performed by: OBSTETRICS & GYNECOLOGY

## 2024-06-07 PROCEDURE — 99213 OFFICE O/P EST LOW 20 MIN: CPT | Performed by: OBSTETRICS & GYNECOLOGY

## 2024-06-07 PROCEDURE — 76817 TRANSVAGINAL US OBSTETRIC: CPT | Performed by: OBSTETRICS & GYNECOLOGY

## 2024-06-07 PROCEDURE — 97110 THERAPEUTIC EXERCISES: CPT

## 2024-06-07 PROCEDURE — 97140 MANUAL THERAPY 1/> REGIONS: CPT

## 2024-06-07 NOTE — PROGRESS NOTES
"[facility]: Va Lujan was seen today at 20w5d for anatomic survey and cervical length screening ultrasound.  See ultrasound report under \"OB Procedures\" tab.  Please don't hesitate to contact our office with any concerns or questions.  -Paty Kovacs MD      "

## 2024-06-07 NOTE — PROGRESS NOTES
Ultrasound Probe Disinfection    A transvaginal ultrasound was performed.   Prior to use, disinfection was performed with High Level Disinfection Process ([employer]).  Probe serial number [id number] was used.      Lorena No  06/07/24  12:53 PM

## 2024-06-07 NOTE — LETTER
"2024     Gisel Roberts PA-C  [address] [facility] [address]    Patient: Va Lujan   YOB: 1990   Date of Visit: 2024       Dear GIL Roberts:    Thank you for referring Va Lujan to me for evaluation. Below are my notes for this consultation.    If you have questions, please do not hesitate to call me. I look forward to following your patient along with you.         Sincerely,        Paty Kovacs MD        CC: No Recipients    Paty Kovacs MD  2024  2:07 PM  Sign when Signing Visit  [facility]: Va Lujan was seen today at 20w5d for anatomic survey and cervical length screening ultrasound.  See ultrasound report under \"OB Procedures\" tab.  Please don't hesitate to contact our office with any concerns or questions.  -Paty Kovacs MD         "

## 2024-06-14 ENCOUNTER — OFFICE VISIT (OUTPATIENT)
Dept: PHYSICAL THERAPY | Facility: REHABILITATION | Age: 34
End: 2024-06-14
Payer: COMMERCIAL

## 2024-06-14 ENCOUNTER — TELEPHONE (OUTPATIENT)
Dept: OBGYN CLINIC | Facility: CLINIC | Age: 34
End: 2024-06-14

## 2024-06-14 DIAGNOSIS — O99.891 BACK PAIN IN PREGNANCY: Primary | ICD-10-CM

## 2024-06-14 DIAGNOSIS — M54.9 BACK PAIN IN PREGNANCY: Primary | ICD-10-CM

## 2024-06-14 PROCEDURE — 97110 THERAPEUTIC EXERCISES: CPT

## 2024-06-14 PROCEDURE — 97140 MANUAL THERAPY 1/> REGIONS: CPT

## 2024-06-14 NOTE — PROGRESS NOTES
Daily Note     Today's date: 2024  Patient name: Va Lujan  : 1990  MRN: [id number]  Referring provider: Piedad Kelly PT  Dx:   Encounter Diagnosis     ICD-10-CM    1. Back pain in pregnancy  O99.891     M54.9             Start Time: 1300  Stop Time: 1400  Total time in clinic (min): 60 minutes      Chief Complaint: Midthoracic Back Pin   HPI: Pt presents to PT 17 weeks gestation w/ ~ 1 month onset of mid thoracic pain. Pain is aggravated by extended sitting, standing, and laying on side.   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 2/10 following. Pt reported L sided pain during the night while sleeping, but subsides upon waking.     Pain before Tx: 6/10  Pain after Tx: better    Objective: See treatment diary below      Assessment: Tolerated treatment well. Patient would benefit from continued PT. Pt tolerated supine on pregnancy cushions for cupping and spinal decompression. Performed rebozo techniques for HEP for pain management. Tx successful in decreasing symptoms. Pt was educated through progression of exercises. Next visit, L&D mechanics.     Access Code: [email or web address]  URL: [email or web address]  Date: 2024  Prepared by: Jenny    Exercises  - Standing Single Arm Row with Resistance Thumb Up  - 1 x daily - 7 x weekly - 2 sets - 10 reps  - Seated Thoracic Extension with Hands Behind Neck  - 1 x daily - 7 x weekly - 3 sets - 10 reps  - Box Lift from the ground  - 1 x daily - 7 x weekly - 3 sets - 10 reps  - Sidelying Open Book Thoracic Lumbar Rotation and Extension  - 1 x daily - 7 x weekly - 2 sets - 10 reps - 5 hold  - Hip Hinge Rock Back  - 1 x daily - 7 x weekly - 2 sets - 10 reps - 5 hold  - Quadruped Thoracic Rotation - Reach Under  - 1 x daily - 7 x weekly - 2 sets - 10 reps - 5 hold  - Thoracic Mobilization with Foam Roll  - 1 x daily - 7 x weekly - 2 sets - 10 reps - 5  hold  - Wall Quarter Squat with Foam Roller  - 1 x daily - 7 x weekly - 2 sets - 10 reps - 5 hold  - Lateral Thoracic Extensions with Foam Roll  - 1 x daily - 7 x weekly - 2 sets - 10 reps - 5 hold    Plan: Continue per plan of care.      Precautions:   Patient Active Problem List   Diagnosis    Pregnancy related nausea, antepartum    14 weeks gestation of pregnancy       Diagnosis:    POC expires (Date that your POC expires) Auth Status? (BOMN, approved, pending) Unit limit (Daily) Auth Start date Expiration date PT/OT + Visit Limit?   7/26/2024  Pending          Date of Service 5/17 6/7 6/14      Visits Used 1 2 3      Visits Remaining           Date 5/17                 Weeks  17 20                [employer] Created                           Neuro Re-Ed         Urge deferral         Breathing Mechanics         PFM Coordination         PFM Down Training          Internal Cueing                  Ther Ex         PFM Strengthening  Q-ped PFM 10x  Hip hinge 10x       Foam roll  Self mobilization 10'                Hip strengthening  Clamshells 2x10       Thread the needle  10x       Open book  10x       Functional Strengthening         Abdominal Strengthening TA Contraction x20  TA x 20  Bridge 10x       Dilator Training         Aerobic         Therapeutic Rest Breaks         Ergonomic Training Box Lift x20     SA Rows - 7# x20     STS x10     Bed transfer training  SA rows x20     Physio ball dynamic adductor stretch     Physioball forward rocks     Straight arm shoulder extensions in supine       Ther Activity         PFM Education         Voiding Diaries          Defecation Mechanics         Fluid Intake          Review of Sxs                  Manual Ther         PFM exam         Ortho exam Performed         PFM Manuals         Fascia decompression  R mid thoracic in s/l 8' R mid thoracic in supine on pregnancy cushions 20'         Rebozo techniques [HEP] 20'       Modalities                           Patient  Education                  Outcome Measure           Oswestry

## 2024-06-14 NOTE — TELEPHONE ENCOUNTER
----- Message from Aleja GARBER sent at 3/28/2024  9:49 AM EDT -----  Regardinnd trimester call  -24    Called pt for 2nd trimester phone assessment. Lmtcb - EPDS sent via my chart.

## 2024-06-21 ENCOUNTER — ROUTINE PRENATAL (OUTPATIENT)
Dept: OBGYN CLINIC | Facility: CLINIC | Age: 34
End: 2024-06-21
Payer: COMMERCIAL

## 2024-06-21 VITALS
OXYGEN SATURATION: 98 % | SYSTOLIC BLOOD PRESSURE: 90 MMHG | DIASTOLIC BLOOD PRESSURE: 60 MMHG | WEIGHT: 106.6 LBS | BODY MASS INDEX: 20.82 KG/M2 | HEART RATE: 97 BPM

## 2024-06-21 DIAGNOSIS — O99.891 BACK PAIN IN PREGNANCY: ICD-10-CM

## 2024-06-21 DIAGNOSIS — Z34.02 ENCOUNTER FOR SUPERVISION OF NORMAL FIRST PREGNANCY IN SECOND TRIMESTER: Primary | ICD-10-CM

## 2024-06-21 DIAGNOSIS — M54.9 BACK PAIN IN PREGNANCY: ICD-10-CM

## 2024-06-21 DIAGNOSIS — Z3A.22 22 WEEKS GESTATION OF PREGNANCY: ICD-10-CM

## 2024-06-21 LAB
SL AMB  POCT GLUCOSE, UA: NEGATIVE
SL AMB POCT URINE PROTEIN: NEGATIVE

## 2024-06-21 PROCEDURE — PNV: Performed by: PHYSICIAN ASSISTANT

## 2024-06-21 PROCEDURE — 81002 URINALYSIS NONAUTO W/O SCOPE: CPT | Performed by: PHYSICIAN ASSISTANT

## 2024-06-21 NOTE — PROGRESS NOTES
22 weeks gestation of pregnancy  Va  is a 34 y.o.  @22w5d who presents for routine prenatal visit.    Denies OB complaints.     She is traveling with her  to [place] and [place] in two weeks!  is anesthesiologist for the network. Will complete 28 wk labs upon return. Reviewed DVT precautions. Advised compression socks and regular ambulation while on flight    NIPT- low risk  MASFP- low risk  Level II - complete; f/u as clinically indicated  28 week lab orders placed; reviewed test timing    Good fetal movement.  Denies contractions, cramping, leakage of fluid or vaginal bleeding.     Reviewed PTL precautions and reasons to call.        Back pain in pregnancy  Having great results with PT sessions

## 2024-06-21 NOTE — ASSESSMENT & PLAN NOTE
Va  is a 34 y.o.  @22w5d who presents for routine prenatal visit.    Denies OB complaints.     She is traveling with her  to [place] and [place] in two weeks!  is anesthesiologist for the network. Will complete 28 wk labs upon return. Reviewed DVT precautions. Advised compression socks and regular ambulation while on flight    NIPT- low risk  MASFP- low risk  Level II - complete; f/u as clinically indicated  28 week lab orders placed; reviewed test timing    Good fetal movement.  Denies contractions, cramping, leakage of fluid or vaginal bleeding.     Reviewed PTL precautions and reasons to call.

## 2024-06-21 NOTE — PROGRESS NOTES
Patient here for prenatal visit.   GA: 22w5d    Denies leaking of fluid, bleeding, cramping  Normal Fetal Movement  Labs-Utd; AFP-negative  28 wk labs ordered today (-)  Level 2 completed 24  No concerns at this time.

## 2024-07-12 ENCOUNTER — OFFICE VISIT (OUTPATIENT)
Dept: PHYSICAL THERAPY | Facility: REHABILITATION | Age: 34
End: 2024-07-12
Payer: COMMERCIAL

## 2024-07-12 DIAGNOSIS — M54.9 BACK PAIN IN PREGNANCY: Primary | ICD-10-CM

## 2024-07-12 DIAGNOSIS — O99.891 BACK PAIN IN PREGNANCY: Primary | ICD-10-CM

## 2024-07-12 PROCEDURE — 97110 THERAPEUTIC EXERCISES: CPT

## 2024-07-12 NOTE — PROGRESS NOTES
Daily Note     Today's date: 2024  Patient name: Va Lujan  : 1990  MRN: [id number]  Referring provider: Piedad Kelly PT  Dx:   Encounter Diagnosis     ICD-10-CM    1. Back pain in pregnancy  O99.891     M54.9               Start Time: 0800  Stop Time: 0900  Total time in clinic (min): 60 minutes      Chief Complaint: Midthoracic Back Pin   HPI: Pt presents to PT 17 weeks gestation w/ ~ 1 month onset of mid thoracic pain. Pain is aggravated by extended sitting, standing, and laying on side.   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          Objective: See treatment diary below      Assessment: Tolerated treatment well. Patient would benefit from continued PT. Pt was educated on PFM function during L&D and practiced L&D mechanics w/ good performance. Reviewed proper breathing and pain management techniques. Provided handout for L&D positions. Pt scheduled for an additional visit to review mechanics closer to delivery. Next visit, review L&D mechanics.     Access Code: [email or web address]  URL: [email or web address]  Date: 2024  Prepared by: Jenny    Exercises  - Standing Single Arm Row with Resistance Thumb Up  - 1 x daily - 7 x weekly - 2 sets - 10 reps  - Seated Thoracic Extension with Hands Behind Neck  - 1 x daily - 7 x weekly - 3 sets - 10 reps  - Box Lift from the ground  - 1 x daily - 7 x weekly - 3 sets - 10 reps  - Sidelying Open Book Thoracic Lumbar Rotation and Extension  - 1 x daily - 7 x weekly - 2 sets - 10 reps - 5 hold  - Hip Hinge Rock Back  - 1 x daily - 7 x weekly - 2 sets - 10 reps - 5 hold  - Quadruped Thoracic Rotation - Reach Under  - 1 x daily - 7 x weekly - 2 sets - 10 reps - 5 hold  - Thoracic Mobilization with Foam Roll  - 1 x daily - 7 x weekly - 2 sets - 10 reps - 5 hold  - Wall Quarter Squat with Foam Roller  - 1 x  daily - 7 x weekly - 2 sets - 10 reps - 5 hold  - Lateral Thoracic Extensions with Foam Roll  - 1 x daily - 7 x weekly - 2 sets - 10 reps - 5 hold    Plan: Continue per plan of care.      Precautions:   Patient Active Problem List   Diagnosis    Pregnancy related nausea, antepartum    14 weeks gestation of pregnancy       Diagnosis:    POC expires (Date that your POC expires) Auth Status? (BOMN, approved, pending) Unit limit (Daily) Auth Start date Expiration date PT/OT + Visit Limit?   7/26/2024  Pending          Date of Service 5/17 6/7 6/14 7/12     Visits Used 1 2 3 4     Visits Remaining           Date 5/17 7/12              Weeks  17 20                [employer] Created                           Neuro Re-Ed         Urge deferral         Breathing Mechanics         PFM Coordination         PFM Down Training          Internal Cueing                  Ther Ex         PFM Strengthening  Q-ped PFM 10x  Hip hinge 10x       Foam roll  Self mobilization 10'                Hip strengthening  Clamshells 2x10       Thread the needle  10x       Open book  10x       Functional Strengthening         Abdominal Strengthening TA Contraction x20  TA x 20  Bridge 10x       Dilator Training         Aerobic         Therapeutic Rest Breaks             L&D mechanics training and breathing techniques 55'     Pain management for plane ride discussed (pelvic tilts, walking, stretching)      Ergonomic Training Box Lift x20     SA Rows - 7# x20     STS x10     Bed transfer training  SA rows x20     Physio ball dynamic adductor stretch     Physioball forward rocks     Straight arm shoulder extensions in supine       Ther Activity         PFM Education         Voiding Diaries          Defecation Mechanics         Fluid Intake          Review of Sxs                  Manual Ther         PFM exam         Ortho exam Performed         PFM Manuals         Fascia decompression  R mid thoracic in s/l 8' R mid thoracic in supine on pregnancy  cushions 20'         Rebozo techniques [HEP] 20'       Modalities                           Patient Education                  Outcome Measure           Oswestry

## 2024-08-09 ENCOUNTER — ROUTINE PRENATAL (OUTPATIENT)
Dept: OBGYN CLINIC | Facility: CLINIC | Age: 34
End: 2024-08-09
Payer: COMMERCIAL

## 2024-08-09 ENCOUNTER — APPOINTMENT (OUTPATIENT)
Dept: LAB | Facility: CLINIC | Age: 34
End: 2024-08-09
Payer: COMMERCIAL

## 2024-08-09 VITALS
BODY MASS INDEX: 22.62 KG/M2 | DIASTOLIC BLOOD PRESSURE: 64 MMHG | SYSTOLIC BLOOD PRESSURE: 112 MMHG | HEART RATE: 96 BPM | WEIGHT: 115.8 LBS

## 2024-08-09 DIAGNOSIS — O99.810 ABNORMAL GLUCOSE IN PREGNANCY, ANTEPARTUM: Primary | ICD-10-CM

## 2024-08-09 DIAGNOSIS — Z3A.29 29 WEEKS GESTATION OF PREGNANCY: Primary | ICD-10-CM

## 2024-08-09 DIAGNOSIS — Z34.02 ENCOUNTER FOR SUPERVISION OF NORMAL FIRST PREGNANCY IN SECOND TRIMESTER: ICD-10-CM

## 2024-08-09 LAB
BASOPHILS # BLD AUTO: 0.03 THOUSANDS/ÂΜL (ref 0–0.1)
BASOPHILS NFR BLD AUTO: 0 % (ref 0–1)
DME PARACHUTE DELIVERY DATE REQUESTED: NORMAL
DME PARACHUTE ITEM DESCRIPTION: NORMAL
DME PARACHUTE ORDER STATUS: NORMAL
DME PARACHUTE SUPPLIER NAME: NORMAL
DME PARACHUTE SUPPLIER PHONE: NORMAL
EOSINOPHIL # BLD AUTO: 0.07 THOUSAND/ÂΜL (ref 0–0.61)
EOSINOPHIL NFR BLD AUTO: 1 % (ref 0–6)
ERYTHROCYTE [DISTWIDTH] IN BLOOD BY AUTOMATED COUNT: 12.3 % (ref 11.6–15.1)
GLUCOSE 1H P 50 G GLC PO SERPL-MCNC: 148 MG/DL (ref 70–134)
HCT VFR BLD AUTO: 38.4 % (ref 34.8–46.1)
HGB BLD-MCNC: 13.1 G/DL (ref 11.5–15.4)
IMM GRANULOCYTES # BLD AUTO: 0.04 THOUSAND/UL (ref 0–0.2)
IMM GRANULOCYTES NFR BLD AUTO: 0 % (ref 0–2)
LYMPHOCYTES # BLD AUTO: 1.31 THOUSANDS/ÂΜL (ref 0.6–4.47)
LYMPHOCYTES NFR BLD AUTO: 13 % (ref 14–44)
MCH RBC QN AUTO: 32.5 PG (ref 26.8–34.3)
MCHC RBC AUTO-ENTMCNC: 34.1 G/DL (ref 31.4–37.4)
MCV RBC AUTO: 95 FL (ref 82–98)
MONOCYTES # BLD AUTO: 0.79 THOUSAND/ÂΜL (ref 0.17–1.22)
MONOCYTES NFR BLD AUTO: 8 % (ref 4–12)
NEUTROPHILS # BLD AUTO: 7.71 THOUSANDS/ÂΜL (ref 1.85–7.62)
NEUTS SEG NFR BLD AUTO: 78 % (ref 43–75)
NRBC BLD AUTO-RTO: 0 /100 WBCS
PLATELET # BLD AUTO: 202 THOUSANDS/UL (ref 149–390)
PMV BLD AUTO: 9.5 FL (ref 8.9–12.7)
RBC # BLD AUTO: 4.03 MILLION/UL (ref 3.81–5.12)
SL AMB  POCT GLUCOSE, UA: NORMAL
SL AMB POCT URINE PROTEIN: NORMAL
TREPONEMA PALLIDUM IGG+IGM AB [PRESENCE] IN SERUM OR PLASMA BY IMMUNOASSAY: NORMAL
WBC # BLD AUTO: 9.95 THOUSAND/UL (ref 4.31–10.16)

## 2024-08-09 PROCEDURE — 82950 GLUCOSE TEST: CPT

## 2024-08-09 PROCEDURE — 86780 TREPONEMA PALLIDUM: CPT

## 2024-08-09 PROCEDURE — 81002 URINALYSIS NONAUTO W/O SCOPE: CPT | Performed by: OBSTETRICS & GYNECOLOGY

## 2024-08-09 PROCEDURE — 85025 COMPLETE CBC W/AUTO DIFF WBC: CPT

## 2024-08-09 PROCEDURE — 36415 COLL VENOUS BLD VENIPUNCTURE: CPT

## 2024-08-09 PROCEDURE — PNV: Performed by: OBSTETRICS & GYNECOLOGY

## 2024-08-09 NOTE — PROGRESS NOTES
Prenatal visit 29w5d  Denies cramping, bleeding, leakage of fluid. Normal fetal movement.  28wk labs completed today  Yellow folder given and reviewed today  Delivery consent signed  Pediatrician referral given  Breast pump ordered   Tdap will consider next visit  Rhogam not needed

## 2024-08-09 NOTE — ASSESSMENT & PLAN NOTE
Pt doing well today, no compaints  +FM. Denies ctx, vb and lof.   28w labs completed this morning- pending. Hb 13.1.     Delivery consents signed today.     The patient was counseled about the labor process.     She was counseled about the possible need for operative delivery using the vacuum or forceps and the indications for doing so. She was counseled that there is a small risk of  complications including intracranial hemorrhage, lacerations, nerve damage or fracture as well as the increased risk for more severe maternal laceration. The patient signed consent.    She was counseled regarding potential reasons that she may need a  section including arrest of dilation/descent, non-reassuring fetal status, or worsening maternal status.      She was counseled on the risks of  including bleeding, infection, and injury to surrounding structures including the bowel and bladder. She was counseled that there are medical and surgical methods to manage excessive postpartum bleeding. She was counseled that in the event of excessive blood loss, she may require blood transfusion which includes a small risk of blood borne diseases such as hepatitis and HIV. The patient is OK with receiving a blood transfusion if necessary.  The patient had an opportunity to ask questions and signed consent.      Considering tdap at next visit- information sheet provided today  Precautions reviewed, RTO in 2 weeks

## 2024-08-09 NOTE — PROGRESS NOTES
Problem List Items Addressed This Visit       29 weeks gestation of pregnancy - Primary     Pt doing well today, no compaints  +FM. Denies ctx, vb and lof.   28w labs completed this morning- pending. Hb 13.1.     Delivery consents signed today.     The patient was counseled about the labor process.     She was counseled about the possible need for operative delivery using the vacuum or forceps and the indications for doing so. She was counseled that there is a small risk of  complications including intracranial hemorrhage, lacerations, nerve damage or fracture as well as the increased risk for more severe maternal laceration. The patient signed consent.    She was counseled regarding potential reasons that she may need a  section including arrest of dilation/descent, non-reassuring fetal status, or worsening maternal status.      She was counseled on the risks of  including bleeding, infection, and injury to surrounding structures including the bowel and bladder. She was counseled that there are medical and surgical methods to manage excessive postpartum bleeding. She was counseled that in the event of excessive blood loss, she may require blood transfusion which includes a small risk of blood borne diseases such as hepatitis and HIV. The patient is OK with receiving a blood transfusion if necessary.  The patient had an opportunity to ask questions and signed consent.      Considering tdap at next visit- information sheet provided today  Precautions reviewed, RTO in 2 weeks         Relevant Orders    POCT urine dip       Va Lujan is a 34 y.o.  at 29w5d who presents today for routine prenatal visit.     /64 (BP Location: Right arm, Patient Position: Sitting, Cuff Size: Standard)   Pulse 96   Wt 52.5 kg (115 lb 12.8 oz)   LMP 2024 (Exact Date)   BMI 22.62 kg/m²       FH 29 cm

## 2024-08-16 ENCOUNTER — APPOINTMENT (OUTPATIENT)
Dept: LAB | Facility: CLINIC | Age: 34
End: 2024-08-16
Payer: COMMERCIAL

## 2024-08-16 DIAGNOSIS — O99.810 ABNORMAL GLUCOSE IN PREGNANCY, ANTEPARTUM: ICD-10-CM

## 2024-08-16 LAB
GLUCOSE 1H P 100 G GLC PO SERPL-MCNC: 157 MG/DL (ref 65–179)
GLUCOSE 2H P 100 G GLC PO SERPL-MCNC: 160 MG/DL (ref 65–154)
GLUCOSE 3H P 100 G GLC PO SERPL-MCNC: 113 MG/DL (ref 65–139)
GLUCOSE P FAST SERPL-MCNC: 65 MG/DL (ref 65–94)

## 2024-08-16 PROCEDURE — 36415 COLL VENOUS BLD VENIPUNCTURE: CPT

## 2024-08-16 PROCEDURE — 82951 GLUCOSE TOLERANCE TEST (GTT): CPT

## 2024-08-16 PROCEDURE — 82952 GTT-ADDED SAMPLES: CPT

## 2024-08-23 ENCOUNTER — ROUTINE PRENATAL (OUTPATIENT)
Dept: OBGYN CLINIC | Facility: CLINIC | Age: 34
End: 2024-08-23
Payer: COMMERCIAL

## 2024-08-23 VITALS
BODY MASS INDEX: 22.46 KG/M2 | OXYGEN SATURATION: 98 % | HEART RATE: 95 BPM | DIASTOLIC BLOOD PRESSURE: 70 MMHG | WEIGHT: 115 LBS | SYSTOLIC BLOOD PRESSURE: 92 MMHG

## 2024-08-23 DIAGNOSIS — Z34.03 ENCOUNTER FOR SUPERVISION OF NORMAL FIRST PREGNANCY IN THIRD TRIMESTER: Primary | ICD-10-CM

## 2024-08-23 DIAGNOSIS — Z3A.31 31 WEEKS GESTATION OF PREGNANCY: ICD-10-CM

## 2024-08-23 LAB
SL AMB  POCT GLUCOSE, UA: NORMAL
SL AMB POCT URINE PROTEIN: NORMAL

## 2024-08-23 PROCEDURE — 81002 URINALYSIS NONAUTO W/O SCOPE: CPT | Performed by: PHYSICIAN ASSISTANT

## 2024-08-23 PROCEDURE — PNV: Performed by: PHYSICIAN ASSISTANT

## 2024-08-23 NOTE — PROGRESS NOTES
Patient here for prenatal visit.   GA: 31w5d    Denies leaking of fluid, bleeding, cramping  Normal Fetal Movement  Labs/Utd  Tdap next visit per patient request  Delivery Consent signed 24  Breast Pump ordered previously  Peds referral given today  No concerns at this time.

## 2024-08-23 NOTE — PROGRESS NOTES
31 weeks gestation of pregnancy  Va  is a 34 y.o.  @31w5d who presents for routine prenatal visit.      28 wk labs - complete, normal CBC and RPR; passed f/u 3 hour GTT  Growth scan- not indicated  TDAP- still considering; will likely desire next visit  Reviewed option of flu, RSV vaccines in office in the fall  Plans to breastfeed. Pump - previously order  Pedes referral placed today  PP Contraception - discussed and will consider options  Has yellow packet.  Delivery consents in media  Reminded to return plan    Reports good fetal movement.  Denies LOF, vaginal bleeding, regular uterine contractions, cramping, headaches or visual changes.      Reviewed PTL/Labor precautions and FKC.

## 2024-08-23 NOTE — ASSESSMENT & PLAN NOTE
Va  is a 34 y.o.  @31w5d who presents for routine prenatal visit.      28 wk labs - complete, normal CBC and RPR; passed f/u 3 hour GTT  Growth scan- not indicated  TDAP- still considering; will likely desire next visit  Reviewed option of flu, RSV vaccines in office in the fall  Plans to breastfeed. Pump - previously order  Pedes referral placed today  PP Contraception - discussed and will consider options  Has yellow packet.  Delivery consents in media  Reminded to return plan    Reports good fetal movement.  Denies LOF, vaginal bleeding, regular uterine contractions, cramping, headaches or visual changes.      Reviewed PTL/Labor precautions and FKC.

## 2024-08-30 LAB
DME PARACHUTE DELIVERY DATE ACTUAL: NORMAL
DME PARACHUTE DELIVERY DATE REQUESTED: NORMAL
DME PARACHUTE ITEM DESCRIPTION: NORMAL
DME PARACHUTE ORDER STATUS: NORMAL
DME PARACHUTE SUPPLIER NAME: NORMAL
DME PARACHUTE SUPPLIER PHONE: NORMAL

## 2024-09-06 ENCOUNTER — ROUTINE PRENATAL (OUTPATIENT)
Dept: OBGYN CLINIC | Facility: CLINIC | Age: 34
End: 2024-09-06
Payer: COMMERCIAL

## 2024-09-06 VITALS
OXYGEN SATURATION: 99 % | HEART RATE: 86 BPM | SYSTOLIC BLOOD PRESSURE: 98 MMHG | WEIGHT: 115.2 LBS | BODY MASS INDEX: 22.5 KG/M2 | DIASTOLIC BLOOD PRESSURE: 76 MMHG

## 2024-09-06 DIAGNOSIS — Z23 NEED FOR TDAP VACCINATION: ICD-10-CM

## 2024-09-06 DIAGNOSIS — Z23 NEED FOR VIRAL IMMUNIZATION: ICD-10-CM

## 2024-09-06 DIAGNOSIS — Z3A.33 33 WEEKS GESTATION OF PREGNANCY: Primary | ICD-10-CM

## 2024-09-06 LAB
SL AMB  POCT GLUCOSE, UA: NEGATIVE
SL AMB POCT URINE PROTEIN: POSITIVE

## 2024-09-06 PROCEDURE — 90472 IMMUNIZATION ADMIN EACH ADD: CPT | Performed by: PHYSICIAN ASSISTANT

## 2024-09-06 PROCEDURE — 90715 TDAP VACCINE 7 YRS/> IM: CPT | Performed by: PHYSICIAN ASSISTANT

## 2024-09-06 PROCEDURE — 90678 RSV VACC PREF BIVALENT IM: CPT | Performed by: PHYSICIAN ASSISTANT

## 2024-09-06 PROCEDURE — 90471 IMMUNIZATION ADMIN: CPT | Performed by: PHYSICIAN ASSISTANT

## 2024-09-06 PROCEDURE — PNV: Performed by: PHYSICIAN ASSISTANT

## 2024-09-06 PROCEDURE — 81002 URINALYSIS NONAUTO W/O SCOPE: CPT | Performed by: PHYSICIAN ASSISTANT

## 2024-09-06 NOTE — PROGRESS NOTES
33 weeks gestation of pregnancy  Va  is a 34 y.o.  @33w5d who presents for routine prenatal visit.        28 wk labs - complete, normal CBC and RPR; passed 3 hour GTT  Growth scan- not indicated  TDAP given today  RSV vaccine given today  PP Contraception - discussed previously  Has yellow packet.  Delivery consents in media  Delivery plan - reviewed typed plan; pt did not realize she needed to fill out [facility]'s delivery plan. She will fill out and return with next visit    Reports good fetal movement.  Denies LOF, vaginal bleeding, regular uterine contractions, cramping, headaches or visual changes.      Reviewed PTL/Labor precautions and FKC.

## 2024-09-06 NOTE — PROGRESS NOTES
Patient here for prenatal visit.   GA: 33w5d    Denies leaking of fluid, bleeding, cramping  Normal Fetal Movement  Labs-Utd  Tdap given today  RSV given today  Delivery Consent signed 24  Breast Pump previously ordered  Peds Referral placed previously  Birth Plan not returned  Patient starting to have sciatica pain.

## 2024-09-06 NOTE — ASSESSMENT & PLAN NOTE
Va  is a 34 y.o.  @33w5d who presents for routine prenatal visit.        28 wk labs - complete, normal CBC and RPR; passed 3 hour GTT  Growth scan- not indicated  TDAP given today  RSV vaccine given today  PP Contraception - discussed previously  Has yellow packet.  Delivery consents in media  Delivery plan - reviewed typed plan; pt did not realize she needed to fill out [facility] delivery plan. She will fill out and return with next visit    Reports good fetal movement.  Denies LOF, vaginal bleeding, regular uterine contractions, cramping, headaches or visual changes.      Reviewed PTL/Labor precautions and FKC.

## 2024-09-20 ENCOUNTER — TELEPHONE (OUTPATIENT)
Dept: OBGYN CLINIC | Facility: CLINIC | Age: 34
End: 2024-09-20

## 2024-09-20 ENCOUNTER — ROUTINE PRENATAL (OUTPATIENT)
Dept: OBGYN CLINIC | Facility: CLINIC | Age: 34
End: 2024-09-20
Payer: COMMERCIAL

## 2024-09-20 VITALS
BODY MASS INDEX: 22.69 KG/M2 | WEIGHT: 116.2 LBS | HEART RATE: 92 BPM | OXYGEN SATURATION: 98 % | SYSTOLIC BLOOD PRESSURE: 90 MMHG | DIASTOLIC BLOOD PRESSURE: 72 MMHG

## 2024-09-20 DIAGNOSIS — Z3A.35 35 WEEKS GESTATION OF PREGNANCY: ICD-10-CM

## 2024-09-20 DIAGNOSIS — Z34.03 ENCOUNTER FOR SUPERVISION OF NORMAL FIRST PREGNANCY IN THIRD TRIMESTER: Primary | ICD-10-CM

## 2024-09-20 LAB
SL AMB  POCT GLUCOSE, UA: NEGATIVE
SL AMB POCT URINE PROTEIN: NEGATIVE

## 2024-09-20 PROCEDURE — PNV: Performed by: PHYSICIAN ASSISTANT

## 2024-09-20 PROCEDURE — 81002 URINALYSIS NONAUTO W/O SCOPE: CPT | Performed by: PHYSICIAN ASSISTANT

## 2024-09-20 NOTE — PROGRESS NOTES
Patient here for prenatal visit.   GA: 35w5d    Denies leaking of fluid, bleeding, cramping  Normal Fetal Movement  Labs/Tdap/RSV-Utd  Delivery Consent signed 24  Breast Pump ordered previously  Peds Referral palced previously  Birth Plan returned today  No concerns at this time.

## 2024-09-20 NOTE — ASSESSMENT & PLAN NOTE
Va  is a 34 y.o.  @35w5d who presents for routine prenatal visit.      28 wk labs - complete, normal CBC and RPR; passed 3 hour GTT  Growth scan- not indicated  TDAP and RSV vaccine up to date  GBS due next visit  PP Contraception - discussed previously  Has yellow packet.  Delivery consents in media  Delivery plan - reviewed and submitted      Reports good fetal movement.  Denies LOF, vaginal bleeding, regular uterine contractions, cramping, headaches or visual changes.      Reviewed PTL/Labor precautions and FKC.

## 2024-09-20 NOTE — PROGRESS NOTES
35 weeks gestation of pregnancy  Va  is a 34 y.o.  @35w5d who presents for routine prenatal visit.      28 wk labs - complete, normal CBC and RPR; passed 3 hour GTT  Growth scan- not indicated  TDAP and RSV vaccine up to date  GBS due next visit  PP Contraception - discussed previously  Has yellow packet.  Delivery consents in media  Delivery plan - reviewed and submitted      Reports good fetal movement.  Denies LOF, vaginal bleeding, regular uterine contractions, cramping, headaches or visual changes.      Reviewed PTL/Labor precautions and FKC.

## 2024-09-27 ENCOUNTER — ROUTINE PRENATAL (OUTPATIENT)
Dept: OBGYN CLINIC | Facility: CLINIC | Age: 34
End: 2024-09-27
Payer: COMMERCIAL

## 2024-09-27 VITALS — SYSTOLIC BLOOD PRESSURE: 120 MMHG | WEIGHT: 115.4 LBS | BODY MASS INDEX: 22.54 KG/M2 | DIASTOLIC BLOOD PRESSURE: 62 MMHG

## 2024-09-27 DIAGNOSIS — Z34.03 ENCOUNTER FOR SUPERVISION OF NORMAL FIRST PREGNANCY IN THIRD TRIMESTER: ICD-10-CM

## 2024-09-27 DIAGNOSIS — Z3A.36 36 WEEKS GESTATION OF PREGNANCY: Primary | ICD-10-CM

## 2024-09-27 LAB
SL AMB  POCT GLUCOSE, UA: NEGATIVE
SL AMB POCT URINE PROTEIN: NEGATIVE

## 2024-09-27 PROCEDURE — 81002 URINALYSIS NONAUTO W/O SCOPE: CPT | Performed by: OBSTETRICS & GYNECOLOGY

## 2024-09-27 PROCEDURE — 87150 DNA/RNA AMPLIFIED PROBE: CPT | Performed by: OBSTETRICS & GYNECOLOGY

## 2024-09-27 PROCEDURE — PNV: Performed by: OBSTETRICS & GYNECOLOGY

## 2024-09-27 NOTE — ASSESSMENT & PLAN NOTE
Pt doing well today, no complaints  +FM. Denies ctx, vb and lof  Is up to date on care- sp tdap and rsv vaccine  Birth plan completed and submitted  GBS collected today  Precautions reviewed. RTO in 1 week

## 2024-09-27 NOTE — PROGRESS NOTES
Problem List Items Addressed This Visit       36 weeks gestation of pregnancy - Primary     Pt doing well today, no complaints  +FM. Denies ctx, vb and lof  Is up to date on care- sp tdap and rsv vaccine  Birth plan completed and submitted  GBS collected today  Precautions reviewed. RTO in 1 week            Other Visit Diagnoses       Encounter for supervision of normal first pregnancy in third trimester        Relevant Orders    POCT urine dip (Completed)    Strep B DNA probe, amplification            Va Lujan is a 34 y.o.  at 36w5d who presents today for routine prenatal visit.     /62 (BP Location: Right arm, Patient Position: Sitting, Cuff Size: Standard)   Wt 52.3 kg (115 lb 6.4 oz)   LMP 2024 (Exact Date)   BMI 22.54 kg/m²       FH 35 cm

## 2024-09-30 LAB — GP B STREP DNA SPEC QL NAA+PROBE: NEGATIVE

## 2024-10-04 ENCOUNTER — ROUTINE PRENATAL (OUTPATIENT)
Dept: OBGYN CLINIC | Facility: CLINIC | Age: 34
End: 2024-10-04
Payer: COMMERCIAL

## 2024-10-04 ENCOUNTER — OFFICE VISIT (OUTPATIENT)
Dept: PHYSICAL THERAPY | Facility: REHABILITATION | Age: 34
End: 2024-10-04
Payer: COMMERCIAL

## 2024-10-04 VITALS
OXYGEN SATURATION: 99 % | SYSTOLIC BLOOD PRESSURE: 104 MMHG | BODY MASS INDEX: 22.65 KG/M2 | WEIGHT: 116 LBS | DIASTOLIC BLOOD PRESSURE: 68 MMHG | HEART RATE: 100 BPM

## 2024-10-04 DIAGNOSIS — Z34.03 ENCOUNTER FOR SUPERVISION OF NORMAL FIRST PREGNANCY IN THIRD TRIMESTER: Primary | ICD-10-CM

## 2024-10-04 DIAGNOSIS — Z3A.37 37 WEEKS GESTATION OF PREGNANCY: ICD-10-CM

## 2024-10-04 DIAGNOSIS — O99.891 BACK PAIN IN PREGNANCY: Primary | ICD-10-CM

## 2024-10-04 DIAGNOSIS — Z23 NEED FOR INFLUENZA VACCINATION: ICD-10-CM

## 2024-10-04 DIAGNOSIS — M54.9 BACK PAIN IN PREGNANCY: Primary | ICD-10-CM

## 2024-10-04 LAB
SL AMB  POCT GLUCOSE, UA: NORMAL
SL AMB POCT URINE PROTEIN: NORMAL

## 2024-10-04 PROCEDURE — 81002 URINALYSIS NONAUTO W/O SCOPE: CPT | Performed by: PHYSICIAN ASSISTANT

## 2024-10-04 PROCEDURE — 97110 THERAPEUTIC EXERCISES: CPT

## 2024-10-04 PROCEDURE — PNV: Performed by: PHYSICIAN ASSISTANT

## 2024-10-04 PROCEDURE — 90656 IIV3 VACC NO PRSV 0.5 ML IM: CPT | Performed by: PHYSICIAN ASSISTANT

## 2024-10-04 PROCEDURE — 90471 IMMUNIZATION ADMIN: CPT | Performed by: PHYSICIAN ASSISTANT

## 2024-10-04 NOTE — PROGRESS NOTES
37 weeks gestation of pregnancy  Va  is a 34 y.o.  @37w5d who presents for routine prenatal visit.      Discussed ARRIVE trial and IOL options    Growth scan- not indicated  TDAP and RSV vaccine up to date  Flu vaccine given today  GBS negative  Plan and consents in media    Reports good fetal movement.  Denies LOF, vaginal bleeding, regular uterine contractions, cramping, headaches or visual changes.      Reviewed PTL/Labor precautions and FKC.

## 2024-10-04 NOTE — PROGRESS NOTES
Daily Note     Today's date: 10/4/2024  Patient name: Va Lujan  : 1990  MRN: [id number]  Referring provider: Piedad Kelly PT  Dx:   Encounter Diagnosis     ICD-10-CM    1. Back pain in pregnancy  O99.891     M54.9                              Chief Complaint: Midthoracic Back Pin   HPI: Pt presents to PT 17 weeks gestation w/ ~ 1 month onset of mid thoracic pain. Pain is aggravated by extended sitting, standing, and laying on side.   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           Objective: See treatment diary below      Assessment: Tolerated treatment well. Patient would benefit from continued PT. Pt was educated on PFM function during L&D and practiced L&D mechanics w/ good performance. Reviewed proper breathing and pain management techniques and perineal massage. Provided handout for references. Will leave POC open for if pain arises in final weeks of pregnancy, however plan to d/c upon delivery.     Access Code: [email or web address]  URL: [email or web address]  Date: 2024  Prepared by: Jenny    Exercises  - Standing Single Arm Row with Resistance Thumb Up  - 1 x daily - 7 x weekly - 2 sets - 10 reps  - Seated Thoracic Extension with Hands Behind Neck  - 1 x daily - 7 x weekly - 3 sets - 10 reps  - Box Lift from the ground  - 1 x daily - 7 x weekly - 3 sets - 10 reps  - Sidelying Open Book Thoracic Lumbar Rotation and Extension  - 1 x daily - 7 x weekly - 2 sets - 10 reps - 5 hold  - Hip Hinge Rock Back  - 1 x daily - 7 x weekly - 2 sets - 10 reps - 5 hold  - Quadruped Thoracic Rotation - Reach Under  - 1 x daily - 7 x weekly - 2 sets - 10 reps - 5 hold  - Thoracic Mobilization with Foam Roll  - 1 x daily - 7 x weekly - 2 sets - 10 reps - 5 hold  - Wall Quarter Squat with Foam Roller  - 1 x daily - 7 x weekly - 2 sets - 10 reps - 5 hold  - Lateral Thoracic  Extensions with Foam Roll  - 1 x daily - 7 x weekly - 2 sets - 10 reps - 5 hold    Plan: D.c if unneeded during remained of pregnancy.      Precautions:   Patient Active Problem List   Diagnosis    Pregnancy related nausea, antepartum    14 weeks gestation of pregnancy       Diagnosis:    POC expires (Date that your POC expires) Auth Status? (BOMN, approved, pending) Unit limit (Daily) Auth Start date Expiration date PT/OT + Visit Limit?   7/26/2024  Approved    5/17/24 11/12/24      Date of Service 5/17 6/7 6/14 7/12 10/4    Visits Used 1 2 3 4 5    Visits Remaining           Date 5/17 7/12              Weeks  17 20                [employer] Created                           Neuro Re-Ed         Urge deferral         Breathing Mechanics         PFM Coordination         PFM Down Training          Internal Cueing                  Ther Ex         PFM Strengthening  Q-ped PFM 10x  Hip hinge 10x       Foam roll  Self mobilization 10'                Hip strengthening  Clamshells 2x10       Thread the needle  10x       Open book  10x       Functional Strengthening         Abdominal Strengthening TA Contraction x20  TA x 20  Bridge 10x       Dilator Training         Aerobic         Therapeutic Rest Breaks             L&D mechanics training and breathing techniques 55'     Pain management for plane ride discussed (pelvic tilts, walking, stretching)  L&D mechanics training and breathing techniques and rebozo techniques 55'         Ergonomic Training Box Lift x20     SA Rows - 7# x20     STS x10     Bed transfer training  SA rows x20     Physio ball dynamic adductor stretch     Physioball forward rocks     Straight arm shoulder extensions in supine       Ther Activity         PFM Education         Voiding Diaries          Defecation Mechanics         Fluid Intake          Review of Sxs                  Manual Ther         PFM exam         Ortho exam Performed         PFM Manuals         Fascia decompression  R mid thoracic in  s/l 8' R mid thoracic in supine on pregnancy cushions 20'         Rebozo techniques [HEP] 20'       Modalities                           Patient Education                  Outcome Measure           Oswestry

## 2024-10-04 NOTE — ASSESSMENT & PLAN NOTE
Va  is a 34 y.o.  @37w5d who presents for routine prenatal visit.      Discussed ARRIVE trial and IOL options    Growth scan- not indicated  TDAP and RSV vaccine up to date  Flu vaccine given today  GBS negative  Plan and consents in media    Reports good fetal movement.  Denies LOF, vaginal bleeding, regular uterine contractions, cramping, headaches or visual changes.      Reviewed PTL/Labor precautions and FKC.

## 2024-10-10 ENCOUNTER — TELEPHONE (OUTPATIENT)
Dept: OBGYN CLINIC | Facility: CLINIC | Age: 34
End: 2024-10-10

## 2024-10-10 ENCOUNTER — ROUTINE PRENATAL (OUTPATIENT)
Dept: OBGYN CLINIC | Facility: CLINIC | Age: 34
End: 2024-10-10
Payer: COMMERCIAL

## 2024-10-10 VITALS
DIASTOLIC BLOOD PRESSURE: 72 MMHG | WEIGHT: 117.4 LBS | SYSTOLIC BLOOD PRESSURE: 100 MMHG | HEART RATE: 101 BPM | OXYGEN SATURATION: 99 % | BODY MASS INDEX: 22.93 KG/M2

## 2024-10-10 DIAGNOSIS — Z34.03 PRENATAL CARE, FIRST PREGNANCY IN THIRD TRIMESTER: ICD-10-CM

## 2024-10-10 DIAGNOSIS — Z3A.38 38 WEEKS GESTATION OF PREGNANCY: Primary | ICD-10-CM

## 2024-10-10 LAB
SL AMB  POCT GLUCOSE, UA: NORMAL
SL AMB POCT URINE PROTEIN: NORMAL

## 2024-10-10 PROCEDURE — 81002 URINALYSIS NONAUTO W/O SCOPE: CPT | Performed by: OBSTETRICS & GYNECOLOGY

## 2024-10-10 PROCEDURE — PNV: Performed by: OBSTETRICS & GYNECOLOGY

## 2024-10-10 NOTE — TELEPHONE ENCOUNTER
----- Message from Madie Gale MD sent at 10/10/2024 12:36 PM EDT -----  Procedure to be scheduled (IOL or CS): IOL  JERED: Estimated Date of Delivery: 10/20/24  Indication for delivery: lateterm  Requested date (s) of delivery: 10/25 or 26  If requested date is unavailable, is there a date by which the pt must be delivered? During 41wk  Physician preference: none  If IOL, anticipated method: Needs ripening (needs evening induction)  If CS, with or without tubal: n/a

## 2024-10-10 NOTE — PROGRESS NOTES
Prenatal visit  35 yo  at GA 38w 4d  Denies any vaginal bleeding, Leaking of fluid or pelvic cramping.   Normal Fetal movement.   28 wk labs completed 2024  S/p Tdap vaccine, flu, and RSV.   Delivery consent signed on 2024  Has breast pump  Peds referral placed previously  GBS neg      Problem List Items Addressed This Visit       37 weeks gestation of pregnancy - Primary     Other Visit Diagnoses       Prenatal care, first pregnancy in third trimester        Relevant Orders    POCT urine dip (Completed)          Reviewed IOL process and consent. Desires IOL around 10/25-. Message sent for scheduling; however, cannot guarantee date. Consent signed. C/L/H. She declined membrane stripping today.  Vertex confirmed on US    Pre-Val Vitals      Flowsheet Row Most Recent Value   Prenatal Assessment    Fetal Heart Rate 150   Fundal Height (cm) 37 cm   Movement Present   Presentation Vertex   Prenatal Vitals    Blood Pressure 100/72   Weight - Scale 53.3 kg (117 lb 6.4 oz)   Urine Albumin/Glucose    Dilation/Effacement/Station    Cervical Dilation 0   Cervical Effacement 0   Fetal Station -5   Vaginal Drainage    Draining Fluid No   Edema    LLE Edema None   RLE Edema None

## 2024-10-16 ENCOUNTER — NURSE TRIAGE (OUTPATIENT)
Dept: OBGYN CLINIC | Facility: MEDICAL CENTER | Age: 34
End: 2024-10-16

## 2024-10-16 NOTE — TELEPHONE ENCOUNTER
Emailed immunization records to email address that was given. Attempted to call patient. Left VM stating that records were sent and to call back if she has any issues.

## 2024-10-16 NOTE — TELEPHONE ENCOUNTER
Regarding: Immunization records.  ----- Message from Elda DAY sent at 10/15/2024  4:45 PM EDT -----  Pt is requesting copy of proof immunization for the flu shot and any immunizations they received at the practice. If possible can it be emailed to: [email or web address]. If not please upload proof of immunization to [employer] so pt can download it.

## 2024-10-18 ENCOUNTER — ROUTINE PRENATAL (OUTPATIENT)
Dept: OBGYN CLINIC | Facility: CLINIC | Age: 34
End: 2024-10-18
Payer: COMMERCIAL

## 2024-10-18 VITALS
SYSTOLIC BLOOD PRESSURE: 90 MMHG | DIASTOLIC BLOOD PRESSURE: 68 MMHG | HEART RATE: 79 BPM | HEIGHT: 60 IN | BODY MASS INDEX: 23.2 KG/M2 | WEIGHT: 118.2 LBS

## 2024-10-18 DIAGNOSIS — Z3A.39 39 WEEKS GESTATION OF PREGNANCY: ICD-10-CM

## 2024-10-18 DIAGNOSIS — Z34.03 PRENATAL CARE, FIRST PREGNANCY IN THIRD TRIMESTER: Primary | ICD-10-CM

## 2024-10-18 LAB
SL AMB  POCT GLUCOSE, UA: NORMAL
SL AMB POCT URINE PROTEIN: NORMAL

## 2024-10-18 PROCEDURE — PNV: Performed by: PHYSICIAN ASSISTANT

## 2024-10-18 PROCEDURE — 81002 URINALYSIS NONAUTO W/O SCOPE: CPT | Performed by: PHYSICIAN ASSISTANT

## 2024-10-18 NOTE — PROGRESS NOTES
39 weeks gestation of pregnancy  Va  is a 34 y.o.  @39w5d who presents for routine prenatal visit.      IOL request has been sent. No availability currently but nurse navigators will call again next week.     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 He will be at a conference this weekend in [facility] so pt prefers to avoid cervical check today. She is back in the office on Tues, 10/22. Will plan for cervix check at this visit. She is hoping IOL can be scheduled for 10/25    Growth scan- not indicated  TDAP, flu, RSV vaccine up to date  GBS negative  Plan and consents in media    Reports good fetal movement.  Denies LOF, vaginal bleeding, regular uterine contractions, cramping, headaches or visual changes.      Reviewed PTL/Labor precautions and FKC.

## 2024-10-18 NOTE — PROGRESS NOTES
Patient presents for a routine prenatal visit    39W5D  Good Fetal Movement  No LOF,bleeding,or cramping.  Has some increase in discharge.  No current complaints at this time.   Delivery and induction consents are signed.  UTD on Tdap, RSV, and flu vaccines.  GBS neg

## 2024-10-18 NOTE — ASSESSMENT & PLAN NOTE
Va  is a 34 y.o.  @39w5d who presents for routine prenatal visit.      IOL request has been sent. No availability currently but nurse navigators will call again next week.     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 He will be at a conference this weekend in [facility] so pt prefers to avoid cervical check today. She is back in the office on Tues, 10/22. Will plan for cervix check at this visit. She is hoping IOL can be scheduled for 10/25    Growth scan- not indicated  TDAP, flu, RSV vaccine up to date  GBS negative  Plan and consents in media    Reports good fetal movement.  Denies LOF, vaginal bleeding, regular uterine contractions, cramping, headaches or visual changes.      Reviewed PTL/Labor precautions and FKC.

## 2024-10-22 ENCOUNTER — TELEPHONE (OUTPATIENT)
Age: 34
End: 2024-10-22

## 2024-10-22 ENCOUNTER — ROUTINE PRENATAL (OUTPATIENT)
Dept: OBGYN CLINIC | Facility: CLINIC | Age: 34
End: 2024-10-22
Payer: COMMERCIAL

## 2024-10-22 VITALS
BODY MASS INDEX: 23.16 KG/M2 | HEART RATE: 77 BPM | SYSTOLIC BLOOD PRESSURE: 100 MMHG | WEIGHT: 118.6 LBS | DIASTOLIC BLOOD PRESSURE: 70 MMHG | OXYGEN SATURATION: 99 %

## 2024-10-22 DIAGNOSIS — Z3A.40 40 WEEKS GESTATION OF PREGNANCY: ICD-10-CM

## 2024-10-22 DIAGNOSIS — Z34.03 PRENATAL CARE, FIRST PREGNANCY IN THIRD TRIMESTER: Primary | ICD-10-CM

## 2024-10-22 LAB
SL AMB  POCT GLUCOSE, UA: NORMAL
SL AMB POCT URINE PROTEIN: NORMAL

## 2024-10-22 PROCEDURE — PNV: Performed by: PHYSICIAN ASSISTANT

## 2024-10-22 PROCEDURE — 81002 URINALYSIS NONAUTO W/O SCOPE: CPT | Performed by: PHYSICIAN ASSISTANT

## 2024-10-22 NOTE — ASSESSMENT & PLAN NOTE
Va  is a 34 y.o.  @40w2d who presents for routine prenatal visit.      She has 40 wk IOL scheduled for tomorrow night. She is admittedly feeling nervous about IOL. Unsure if she wants to keep IOL or try to wait for spontaneous labor. Aware IOL would be recommended by early 41 weeks. If she has alternate preferred date in mind cannot guarantee we can absolutely accommodate. Had long discussion today about IOL process - pt reports feeling more comfortable after discussion today. Encouraged her to go home and discuss with her partner if wants to cancel IOL call office sooner rather than later. Keep IOL with ripening for now. Cervix closed/50/-4    Growth scan- not indicated  TDAP, flu, RSV vaccine up to date  GBS negative  Plan and consents in media    Reports good fetal movement.  Denies LOF, vaginal bleeding, regular uterine contractions, cramping, headaches or visual changes.      Reviewed PTL/Labor precautions and FKC.

## 2024-10-22 NOTE — TELEPHONE ENCOUNTER
Pt seen in office today for 40 wk OB visit and discussed option to R/S IOL with provider. Pt called and communicated decision that she would like to R/S IOL. She is currently scheduled for tomorrow, 10/23, @ 8:00pm. Please provide a call back to assist to R/S around 41 wk adolfo.

## 2024-10-22 NOTE — PROGRESS NOTES
40 weeks gestation of pregnancy  Va  is a 34 y.o.  @40w2d who presents for routine prenatal visit.      She has 40 wk IOL scheduled for tomorrow night. She is admittedly feeling nervous about IOL. Unsure if she wants to keep IOL or try to wait for spontaneous labor. Aware IOL would be recommended by early 41 weeks. If she has alternate preferred date in mind cannot guarantee we can absolutely accommodate. Had long discussion today about IOL process - pt reports feeling more comfortable after discussion today. Encouraged her to go home and discuss with her partner if wants to cancel IOL call office sooner rather than later. Keep IOL with ripening for now. Cervix closed/50/-4    Growth scan- not indicated  TDAP, flu, RSV vaccine up to date  GBS negative  Plan and consents in media    Reports good fetal movement.  Denies LOF, vaginal bleeding, regular uterine contractions, cramping, headaches or visual changes.      Reviewed PTL/Labor precautions and FKC.

## 2024-10-22 NOTE — PROGRESS NOTES
Prenatal visit  GA 40w 2d  Denies any vaginal bleeding, Leaking of fluid. Patient reports mild pelvic cramping yesterday.   Normal Fetal movement   IOL scheduled 10/23/2024  28 wk labs completed   S/p Tdap, Flu, & RSV vaccines.    Delivery consent signed on 08/09/2024  Breast pump ordered previously  Peds referral previously placed.   GBS neg   Cervical check today.

## 2024-10-24 NOTE — TELEPHONE ENCOUNTER
10/29 3PM KTM (per EC ok to schedule)    Patient notified of IOL date,time,and location. Advised patient she may eat a light breakfast/dinner prior to going to L&D. In the interim please report any vaginal bleeding,leakage of fluid,decreased fetal movement or contractions.Reviewed fetal kick counts. Advised to keep all upcoming prenatal visits.       Patient will need NST/AMANDA for post dates- will send message to VIN

## 2024-10-29 ENCOUNTER — HOSPITAL ENCOUNTER (OUTPATIENT)
Dept: LABOR AND DELIVERY | Facility: HOSPITAL | Age: 34
Discharge: HOME | End: 2024-10-29
Payer: COMMERCIAL

## 2024-10-29 ENCOUNTER — ANESTHESIA EVENT (INPATIENT)
Dept: LABOR AND DELIVERY | Facility: HOSPITAL | Age: 34
End: 2024-10-29
Payer: COMMERCIAL

## 2024-10-29 ENCOUNTER — HOSPITAL ENCOUNTER (INPATIENT)
Facility: HOSPITAL | Age: 34
LOS: 4 days | Discharge: HOME | End: 2024-11-02
Attending: OBSTETRICS & GYNECOLOGY | Admitting: OBSTETRICS & GYNECOLOGY
Payer: COMMERCIAL

## 2024-10-29 ENCOUNTER — ANESTHESIA (INPATIENT)
Dept: LABOR AND DELIVERY | Facility: HOSPITAL | Age: 34
End: 2024-10-29
Payer: COMMERCIAL

## 2024-10-29 DIAGNOSIS — Z41.9 PATIENT-REQUESTED PROCEDURE: ICD-10-CM

## 2024-10-29 DIAGNOSIS — Z98.891 S/P PRIMARY LOW TRANSVERSE C-SECTION: Chronic | ICD-10-CM

## 2024-10-29 DIAGNOSIS — Z3A.41 41 WEEKS GESTATION OF PREGNANCY: Primary | ICD-10-CM

## 2024-10-29 DIAGNOSIS — O48.0 41 WEEKS GESTATION OF PREGNANCY: Primary | ICD-10-CM

## 2024-10-29 PROBLEM — O99.891 BACK PAIN IN PREGNANCY: Status: RESOLVED | Noted: 2024-06-21 | Resolved: 2024-10-29

## 2024-10-29 PROBLEM — M54.9 BACK PAIN IN PREGNANCY: Status: RESOLVED | Noted: 2024-06-21 | Resolved: 2024-10-29

## 2024-10-29 LAB
ABO GROUP BLD: NORMAL
BLD GP AB SCN SERPL QL: NEGATIVE
ERYTHROCYTE [DISTWIDTH] IN BLOOD BY AUTOMATED COUNT: 13 % (ref 11.6–15.1)
HCT VFR BLD AUTO: 38.4 % (ref 34.8–46.1)
HGB BLD-MCNC: 13.1 G/DL (ref 11.5–15.4)
HOLD SPECIMEN: NORMAL
MCH RBC QN AUTO: 32 PG (ref 26.8–34.3)
MCHC RBC AUTO-ENTMCNC: 34.1 G/DL (ref 31.4–37.4)
MCV RBC AUTO: 94 FL (ref 82–98)
PLATELET # BLD AUTO: 192 THOUSANDS/UL (ref 149–390)
PMV BLD AUTO: 9.5 FL (ref 8.9–12.7)
RBC # BLD AUTO: 4.1 MILLION/UL (ref 3.81–5.12)
RH BLD: POSITIVE
SPECIMEN EXPIRATION DATE: NORMAL
TREPONEMA PALLIDUM IGG+IGM AB [PRESENCE] IN SERUM OR PLASMA BY IMMUNOASSAY: NORMAL
WBC # BLD AUTO: 8.62 THOUSAND/UL (ref 4.31–10.16)

## 2024-10-29 PROCEDURE — NC001 PR NO CHARGE: Performed by: OBSTETRICS & GYNECOLOGY

## 2024-10-29 PROCEDURE — 86901 BLOOD TYPING SEROLOGIC RH(D): CPT

## 2024-10-29 PROCEDURE — 85027 COMPLETE CBC AUTOMATED: CPT

## 2024-10-29 PROCEDURE — 3E0P7VZ INTRODUCTION OF HORMONE INTO FEMALE REPRODUCTIVE, VIA NATURAL OR ARTIFICIAL OPENING: ICD-10-PCS | Performed by: OBSTETRICS & GYNECOLOGY

## 2024-10-29 PROCEDURE — 86900 BLOOD TYPING SEROLOGIC ABO: CPT

## 2024-10-29 PROCEDURE — 86780 TREPONEMA PALLIDUM: CPT

## 2024-10-29 PROCEDURE — 86850 RBC ANTIBODY SCREEN: CPT

## 2024-10-29 PROCEDURE — 4A1HXCZ MONITORING OF PRODUCTS OF CONCEPTION, CARDIAC RATE, EXTERNAL APPROACH: ICD-10-PCS | Performed by: OBSTETRICS & GYNECOLOGY

## 2024-10-29 RX ORDER — SODIUM CHLORIDE, SODIUM LACTATE, POTASSIUM CHLORIDE, CALCIUM CHLORIDE 600; 310; 30; 20 MG/100ML; MG/100ML; MG/100ML; MG/100ML
125 INJECTION, SOLUTION INTRAVENOUS CONTINUOUS
Status: DISCONTINUED | OUTPATIENT
Start: 2024-10-29 | End: 2024-11-02 | Stop reason: HOSPADM

## 2024-10-29 RX ORDER — CALCIUM CARBONATE 500 MG/1
1000 TABLET, CHEWABLE ORAL 3 TIMES DAILY PRN
Status: DISCONTINUED | OUTPATIENT
Start: 2024-10-29 | End: 2024-10-31

## 2024-10-29 RX ORDER — ONDANSETRON 2 MG/ML
4 INJECTION INTRAMUSCULAR; INTRAVENOUS EVERY 6 HOURS PRN
Status: DISCONTINUED | OUTPATIENT
Start: 2024-10-29 | End: 2024-10-31

## 2024-10-29 RX ORDER — ACETAMINOPHEN 325 MG/1
975 TABLET ORAL EVERY 8 HOURS PRN
Status: DISCONTINUED | OUTPATIENT
Start: 2024-10-29 | End: 2024-10-31

## 2024-10-29 RX ORDER — MORPHINE SULFATE 4 MG/ML
4 INJECTION, SOLUTION INTRAMUSCULAR; INTRAVENOUS ONCE
Status: DISCONTINUED | OUTPATIENT
Start: 2024-10-29 | End: 2024-10-31

## 2024-10-29 RX ORDER — BUPIVACAINE HYDROCHLORIDE 2.5 MG/ML
30 INJECTION, SOLUTION EPIDURAL; INFILTRATION; INTRACAUDAL ONCE AS NEEDED
Status: DISCONTINUED | OUTPATIENT
Start: 2024-10-29 | End: 2024-10-31

## 2024-10-29 RX ADMIN — Medication 25 MCG: at 16:46

## 2024-10-29 RX ADMIN — SODIUM CHLORIDE, SODIUM LACTATE, POTASSIUM CHLORIDE, AND CALCIUM CHLORIDE 125 ML/HR: .6; .31; .03; .02 INJECTION, SOLUTION INTRAVENOUS at 16:56

## 2024-10-29 RX ADMIN — SODIUM CHLORIDE, SODIUM LACTATE, POTASSIUM CHLORIDE, AND CALCIUM CHLORIDE 999 ML/HR: .6; .31; .03; .02 INJECTION, SOLUTION INTRAVENOUS at 21:45

## 2024-10-29 NOTE — H&P
H & P- Obstetrics   Va Lujan 34 y.o. female MRN: [id number]  Unit/Bed#: -01 Encounter: [id number]    Assessment: 34 y.o.  at 41w2d admitted for late term IOL.  SVE: 0/0/-4  FHT:  Baseline of 130/moderate variability/15 x 15 accels present/no decelerations. Reactive tracing.  Clinical EFW: 26th percentile ; Cephalic confirmed by ultrasound  GBS status: Negaitve     Plan:   * 41 weeks gestation of pregnancy  Assessment & Plan  Admit to OBGYN   Clear liquid diet   F/u T&S, CBC, RPR   IVF LR 125cc/hr   Continuous fetal monitoring and tocometry   Analgesia at maternal request   Vertex by TAUS  Induction plan: vaginal Cytotec with blankenship balloon and pitocin titration to follow, possible AROM             Discussed case and plan w/ Dr. Steele      Chief Complaint: here for my induction     HPI: Va Lujan is a 34 y.o.  with an JERED of 10/20/2024, by Last Menstrual Period at 41w2d who is being admitted for IOL. She denies having uterine contractions, has no LOF, and reports no VB. She states she has felt good FM.    Patient Active Problem List   Diagnosis    Pregnancy related nausea, antepartum    41 weeks gestation of pregnancy       Baby complications/comments: none    Review of Systems   Constitutional:  Negative for chills and fever.   Respiratory:  Negative for cough, shortness of breath and wheezing.    Cardiovascular:  Negative for chest pain and leg swelling.   Gastrointestinal:  Negative for abdominal pain, diarrhea, nausea and vomiting.   Genitourinary:  Negative for pelvic pain, vaginal bleeding and vaginal discharge.   Musculoskeletal:  Negative for back pain.   Neurological:  Negative for weakness, light-headedness and headaches.       OB Hx:  OB History    Para Term  AB Living   1             SAB IAB Ectopic Multiple Live Births                  # Outcome Date GA Lbr Satinder/2nd Weight Sex Type Anes PTL Lv   1 Current                Past Medical Hx:  Past Medical History:    Diagnosis Date    Varicella     had chickenpox       Past Surgical hx:  Past Surgical History:   Procedure Laterality Date    WISDOM TOOTH EXTRACTION         Social Hx:  Alcohol use: denies  Tobacco use: denies  Other substance use: denies      No Known Allergies      Medications Prior to Admission:     Prenatal MV-Min-Fe Fum-FA-DHA (PRENATAL 1 PO)    Objective:     Body mass index is 23.05 kg/m².     Physical Exam:  Physical Exam  Constitutional:       Appearance: Normal appearance.   Cardiovascular:      Rate and Rhythm: Normal rate and regular rhythm.   Pulmonary:      Effort: Pulmonary effort is normal. No respiratory distress.   Abdominal:      Palpations: Abdomen is soft.      Tenderness: There is no abdominal tenderness.   Musculoskeletal:         General: No swelling or tenderness.   Neurological:      General: No focal deficit present.      Mental Status: She is alert and oriented to person, place, and time.   Skin:     General: Skin is warm and dry.   Vitals reviewed.               Lab Results   Component Value Date    WBC 9.95 08/09/2024    HGB 13.1 08/09/2024    HCT 38.4 08/09/2024     08/09/2024     Lab Results   Component Value Date    K 4.4 09/12/2023     09/12/2023    CO2 29 09/12/2023    BUN 15 09/12/2023    CREATININE 0.80 09/12/2023    AST 17 09/12/2023    ALT 10 09/12/2023     Prenatal Labs: Reviewed      Blood type: O pos  Antibody: Negative  GBS: Negative  HIV: Non-reactive  Rubella: Immune  Syphilis IgM/IgG: Non-reactive  HBsAg: Non-reactive  HCAb: Non-reactive  Chlamydia: Negative  Gonorrhea: Negative  Diabetes 1 hour screen: 148  3 hour glucose: 65,157,160,113  Platelets: 202 on 8/9/24, follow-up admission CBC  Hgb: 13.1on 8/9/24, follow-up admission CBC  >2 Midnights  INPATIENT     Signature/Title: Kyung Lucio MD  Date: 10/29/2024  Time: 4:16 PM

## 2024-10-29 NOTE — PLAN OF CARE
Problem: Knowledge Deficit  Goal: Verbalizes understanding of labor plan  Description: Assess patient/family/caregiver's baseline knowledge level and ability to understand information.  Provide education via patient/family/caregiver's preferred learning method at appropriate level of understanding.     1. Provide teaching at level of understanding.  2. Provide teaching via preferred learning method(s).  Outcome: Progressing     Problem: Labor & Delivery  Goal: Manages discomfort  Description: Assess and monitor for signs and symptoms of discomfort.  Assess patient's pain level regularly and per hospital policy.  Administer medications as ordered. Support use of nonpharmacological methods to help control pain such as distraction, imagery, relaxation, and application of heat and cold.  Collaborate with interdisciplinary team and patient to determine appropriate pain management plan.    1. Include patient in decisions related to comfort.  2. Offer non-pharmacological pain management interventions.  3. Report ineffective pain management to physician.  Outcome: Progressing  Goal: Patient vital signs are stable  Description: 1. Assess vital signs - vaginal delivery.  Outcome: Progressing     Problem: BIRTH - VAGINAL/ SECTION  Goal: Fetal and maternal status remain reassuring during the birth process  Description: INTERVENTIONS:  - Monitor vital signs  - Monitor fetal heart rate  - Monitor uterine activity  - Monitor labor progression (vaginal delivery)  - DVT prophylaxis  - Antibiotic prophylaxis  Outcome: Progressing  Goal: Emotionally satisfying birthing experience for mother/fetus  Description: Interventions:  - Assess, plan, implement and evaluate the nursing care given to the patient in labor  - Advocate the philosophy that each childbirth experience is a unique experience and support the family's chosen level of involvement and control during the labor process   - Actively participate in both the patient's  and family's teaching of the birth process  - Consider cultural, Zoroastrianism and age-specific factors and plan care for the patient in labor  Outcome: Progressing

## 2024-10-29 NOTE — PLAN OF CARE
Problem: Knowledge Deficit  Goal: Verbalizes understanding of labor plan  Description: Assess patient/family/caregiver's baseline knowledge level and ability to understand information.  Provide education via patient/family/caregiver's preferred learning method at appropriate level of understanding.     1. Provide teaching at level of understanding.  2. Provide teaching via preferred learning method(s).  Outcome: Progressing  Goal: Patient/family/caregiver demonstrates understanding of disease process, treatment plan, medications, and discharge instructions  Description: Complete learning assessment and assess knowledge base.  Interventions:  - Provide teaching at level of understanding  - Provide teaching via preferred learning methods  Outcome: Progressing     Problem: Labor & Delivery  Goal: Manages discomfort  Description: Assess and monitor for signs and symptoms of discomfort.  Assess patient's pain level regularly and per hospital policy.  Administer medications as ordered. Support use of nonpharmacological methods to help control pain such as distraction, imagery, relaxation, and application of heat and cold.  Collaborate with interdisciplinary team and patient to determine appropriate pain management plan.    1. Include patient in decisions related to comfort.  2. Offer non-pharmacological pain management interventions.  3. Report ineffective pain management to physician.  Outcome: Progressing  Goal: Patient vital signs are stable  Description: 1. Assess vital signs - vaginal delivery.  Outcome: Progressing     Problem: BIRTH - VAGINAL/ SECTION  Goal: Fetal and maternal status remain reassuring during the birth process  Description: INTERVENTIONS:  - Monitor vital signs  - Monitor fetal heart rate  - Monitor uterine activity  - Monitor labor progression (vaginal delivery)  - DVT prophylaxis if applicable  - Antibiotic prophylaxis if applicable   Outcome: Progressing  Goal: Emotionally satisfying  birthing experience for mother/fetus  Description: Interventions:  - Assess, plan, implement and evaluate the nursing care given to the patient in labor  - Advocate the philosophy that each childbirth experience is a unique experience and support the family's chosen level of involvement and control during the labor process   - Actively participate in both the patient's and family's teaching of the birth process  - Consider cultural, Yarsani and age-specific factors and plan care for the patient in labor  Outcome: Progressing     Problem: PAIN - ADULT  Goal: Verbalizes/displays adequate comfort level or baseline comfort level  Description: Interventions:  - Encourage patient to monitor pain and request assistance  - Assess pain using appropriate pain scale  - Administer analgesics based on type and severity of pain and evaluate response  - Implement non-pharmacological measures as appropriate and evaluate response  - Consider cultural and social influences on pain and pain management  - Notify physician/advanced practitioner if interventions unsuccessful or patient reports new pain  Outcome: Progressing     Problem: INFECTION - ADULT  Goal: Absence or prevention of progression during hospitalization  Description: INTERVENTIONS:  - Assess and monitor for signs and symptoms of infection  - Monitor lab/diagnostic results  - Monitor all insertion sites, i.e. indwelling lines, tubes, and drains    - [place] appropriate cooling/warming therapies per order  - Administer medications as ordered  - Instruct and encourage patient and family to use good hand hygiene technique  - Identify and instruct in appropriate isolation precautions for identified infection/condition  Outcome: Progressing  Goal: Absence of fever/infection during neutropenic period  Description: INTERVENTIONS:  - Monitor WBC as ordered     Outcome: Progressing     Problem: SAFETY ADULT  Goal: Patient will remain free of falls  Description:  INTERVENTIONS:  - Educate patient/family on patient safety including physical limitations  - Instruct patient to call for assistance with activity   - Consult OT/PT to assist with strengthening/mobility   - Keep Call bell within reach  - Keep bed low and locked with side rails adjusted as appropriate  - Keep care items and personal belongings within reach  - Initiate and maintain comfort rounds  - Make Fall Risk Sign visible to staff    - Apply yellow socks and bracelet for high fall risk patients  - Consider moving patient to room near nurses station  Outcome: Progressing  Goal: Maintain or return to baseline ADL function  Description: INTERVENTIONS:  -  Assess patient's ability to carry out ADLs; assess patient's baseline for ADL function and identify physical deficits which impact ability to perform ADLs (bathing, care of mouth/teeth, toileting, grooming, dressing, etc.)  - Assess/evaluate cause of self-care deficits   - Assess range of motion  - Assess patient's mobility; develop plan if impaired  - Assess patient's need for assistive devices and provide as appropriate  - Encourage maximum independence but intervene and supervise when necessary  - Involve family in performance of ADLs  - Assess for home care needs following discharge   - Consider OT consult to assist with ADL evaluation and planning for discharge  - Provide patient education as appropriate  Outcome: Progressing  Goal: Maintains/Returns to pre admission functional level  Description: INTERVENTIONS:  - Perform AM-PAC 6 Click Basic Mobility/ Daily Activity assessment daily.  - Set and communicate daily mobility goal to care team and patient/family/caregiver.   - Collaborate with rehabilitation services on mobility goals if consulted    - Out of bed for toileting  - Record patient progress and toleration of activity level   Outcome: Progressing     Problem: DISCHARGE PLANNING  Goal: Discharge to home or other facility with appropriate  resources  Description: INTERVENTIONS:  - Identify barriers to discharge w/patient and caregiver  - Arrange for needed discharge resources and transportation as appropriate  - Identify discharge learning needs (meds, wound care, etc.)  - Arrange for interpretive services to assist at discharge as needed  - Refer to Case Management Department for coordinating discharge planning if the patient needs post-hospital services based on physician/advanced practitioner order or complex needs related to functional status, cognitive ability, or social support system  Outcome: Progressing

## 2024-10-29 NOTE — ASSESSMENT & PLAN NOTE
Admit to OBGYN   Clear liquid diet   F/u T&S, CBC, RPR   IVF LR 125cc/hr   Continuous fetal monitoring and tocometry   Analgesia at maternal request   Vertex by TAUS  Induction plan: vaginal Cytotec with blankenship balloon and pitocin titration to follow, possible AROM

## 2024-10-30 PROCEDURE — 10907ZC DRAINAGE OF AMNIOTIC FLUID, THERAPEUTIC FROM PRODUCTS OF CONCEPTION, VIA NATURAL OR ARTIFICIAL OPENING: ICD-10-PCS | Performed by: OBSTETRICS & GYNECOLOGY

## 2024-10-30 PROCEDURE — 10H07YZ INSERTION OF OTHER DEVICE INTO PRODUCTS OF CONCEPTION, VIA NATURAL OR ARTIFICIAL OPENING: ICD-10-PCS | Performed by: OBSTETRICS & GYNECOLOGY

## 2024-10-30 RX ORDER — LIDOCAINE HYDROCHLORIDE AND EPINEPHRINE 15; 5 MG/ML; UG/ML
INJECTION, SOLUTION EPIDURAL AS NEEDED
Status: DISCONTINUED | OUTPATIENT
Start: 2024-10-30 | End: 2024-10-31

## 2024-10-30 RX ORDER — OXYTOCIN/RINGER'S LACTATE 30/500 ML
1-30 PLASTIC BAG, INJECTION (ML) INTRAVENOUS
Status: DISCONTINUED | OUTPATIENT
Start: 2024-10-30 | End: 2024-10-31

## 2024-10-30 RX ORDER — OXYTOCIN/RINGER'S LACTATE 30/500 ML
PLASTIC BAG, INJECTION (ML) INTRAVENOUS
Status: DISPENSED
Start: 2024-10-30 | End: 2024-10-30

## 2024-10-30 RX ADMIN — SODIUM CHLORIDE, SODIUM LACTATE, POTASSIUM CHLORIDE, AND CALCIUM CHLORIDE 925 ML/HR: .6; .31; .03; .02 INJECTION, SOLUTION INTRAVENOUS at 20:47

## 2024-10-30 RX ADMIN — SODIUM CHLORIDE, SODIUM LACTATE, POTASSIUM CHLORIDE, AND CALCIUM CHLORIDE 125 ML/HR: .6; .31; .03; .02 INJECTION, SOLUTION INTRAVENOUS at 00:46

## 2024-10-30 RX ADMIN — SODIUM CHLORIDE, SODIUM LACTATE, POTASSIUM CHLORIDE, AND CALCIUM CHLORIDE 125 ML/HR: .6; .31; .03; .02 INJECTION, SOLUTION INTRAVENOUS at 06:52

## 2024-10-30 RX ADMIN — LIDOCAINE HYDROCHLORIDE AND EPINEPHRINE 3 ML: 15; 5 INJECTION, SOLUTION EPIDURAL at 00:20

## 2024-10-30 RX ADMIN — ROPIVACAINE HYDROCHLORIDE: 2 INJECTION, SOLUTION EPIDURAL; INFILTRATION at 00:20

## 2024-10-30 RX ADMIN — OXYTOCIN 2 MILLI-UNITS/MIN: 10 INJECTION INTRAVENOUS at 03:48

## 2024-10-30 RX ADMIN — ACETAMINOPHEN 650 MG: 325 TABLET ORAL at 18:18

## 2024-10-30 RX ADMIN — SODIUM CHLORIDE, SODIUM LACTATE, POTASSIUM CHLORIDE, AND CALCIUM CHLORIDE 125 ML/HR: .6; .31; .03; .02 INJECTION, SOLUTION INTRAVENOUS at 12:59

## 2024-10-30 NOTE — ANESTHESIA PROCEDURE NOTES
Epidural Block    Patient location during procedure: OB/L&D  Start time: 10/30/2024 12:18 AM  Reason for block: procedure for pain  Staffing  Performed by: Gus Negro CRNA  Authorized by: Lio Lopez MD    Preanesthetic Checklist  Completed: patient identified, IV checked, site marked, risks and benefits discussed, surgical consent, monitors and equipment checked, pre-op evaluation and timeout performed  Epidural  Patient position: sitting  Prep: ChloraPrep  Sedation Level: no sedation  Patient monitoring: frequent blood pressure checks, continuous pulse oximetry and heart rate  Approach: midline  Location: lumbar, L3-4  Injection technique: DENNIS saline  Needle  Needle type: Tuohy   Needle gauge: 18 G  Needle insertion depth: 4 cm  Catheter type: multi-orifice  Catheter size: 20 G  Catheter at skin depth: 9 cm  Catheter securement method: stabilization device and clear occlusive dressing  Test dose: negative  Assessment  Sensory level: T10  Number of attempts: 1negative aspiration for CSF, negative aspiration for heme and no paresthesia on injection  patient tolerated the procedure well with no immediate complications  Additional Notes  DENNIS@ 4 cm; catheter threaded easily; secured @ 9 cm

## 2024-10-30 NOTE — QUICK NOTE
Assumed care of the patient this evening.    33yo  at 41.3 wk here for IOL for late term    Labor: on pitocin, IUPC in place. MVU inadequate. Plan to reexamine in 2-4 hours from last exam.   Cat 2 secondary to intermittent late decelerations, overall reassuring with moderate variability  GBS neg

## 2024-10-30 NOTE — OB LABOR/OXYTOCIN SAFETY PROGRESS
Oxytocin Safety Progress Check Note - Va Lujan 34 y.o. female MRN: [id number]    Unit/Bed#: -01 Encounter: [id number]    Dose (adolfo-units/min) Oxytocin: 4 adolfo-units/min  Contraction Frequency (minutes): 1-4  Contraction Intensity: Mild  Uterine Activity Characteristics: Coupling  Cervical Dilation: 4-5        Cervical Effacement: 90  Fetal Station: 0  Baseline Rate (FHR): 140 bpm  Fetal Heart Rate (FHT): 141 BPM  FHR Category: 2     Provider Notified: Yes  Provider Name: Dr Edmonds      Vital Signs:   Vitals:    10/30/24 1221   BP: 107/70   Pulse: 83   Resp:    Temp:    SpO2:        Notes/comments:   SVE as noted above. Variable deceleration noted on cervical check, which resolved on it's own. Pit currently at 2. Patient kala every 2 minutes. Dr. Álvarez made aware.       Rene Montes De Oca MD 10/30/2024 12:54 PM

## 2024-10-30 NOTE — OB LABOR/OXYTOCIN SAFETY PROGRESS
Labor Progress Note - Va Lujan 34 y.o. female MRN: [id number]    Unit/Bed#: -01 Encounter: [id number]       Contraction Frequency (minutes): 1.5-5  Contraction Intensity: Moderate, Moderate/Strong  Uterine Activity Characteristics: Tachysystole  Cervical Dilation: 1        Cervical Effacement: 50  Fetal Station: -3  Baseline Rate (FHR): 140 bpm  Fetal Heart Rate (FHT): 139 BPM  FHR Category: 1     Provider Notified: Yes  Provider Name: Dr Edmonds      Vital Signs:   Vitals:    10/29/24 2335   BP:    Pulse: 83   Resp: 20   Temp: 98.3 °F (36.8 °C)   SpO2: 99%       Notes/comments:   SVE 1/50/-3. Plan for Hawkins balloon for continued induction management. FHT category 1. Ctx q1-2m. Epidural available at patient request. Dr. Do aware.     Braulio Bah MD 10/29/2024 11:50 PM

## 2024-10-30 NOTE — OB LABOR/OXYTOCIN SAFETY PROGRESS
Labor Progress Note - Va Lujan 34 y.o. female MRN: [id number]    Unit/Bed#: -01 Encounter: [id number]       Contraction Frequency (minutes): 1.5-4  Contraction Intensity: Moderate, Moderate/Strong  Uterine Activity Characteristics: Tachysystole  Cervical Dilation: 3        Cervical Effacement: 70  Fetal Station: -1  Baseline Rate (FHR): 140 bpm  Fetal Heart Rate (FHT): 143 BPM  FHR Category: I     Provider Notified: Yes  Provider Name: Dr Edmonds      Vital Signs:   Vitals:    10/30/24 0300   BP:    Pulse: 92   Resp:    Temp:    SpO2:        Notes/comments:   Hawkins balloon out. SVE as above. FHT Cat I. Contractions irregular q2-4. Plan to start pitocin.     Lisa Edmonds MD 10/30/2024 3:18 AM

## 2024-10-30 NOTE — PLAN OF CARE
Problem: Knowledge Deficit  Goal: Verbalizes understanding of labor plan  Description: Assess patient/family/caregiver's baseline knowledge level and ability to understand information.  Provide education via patient/family/caregiver's preferred learning method at appropriate level of understanding.     1. Provide teaching at level of understanding.  2. Provide teaching via preferred learning method(s).  Outcome: Progressing  Goal: Patient/family/caregiver demonstrates understanding of disease process, treatment plan, medications, and discharge instructions  Description: Complete learning assessment and assess knowledge base.  Interventions:  - Provide teaching at level of understanding  - Provide teaching via preferred learning methods  Outcome: Progressing     Problem: Labor & Delivery  Goal: Manages discomfort  Description: Assess and monitor for signs and symptoms of discomfort.  Assess patient's pain level regularly and per hospital policy.  Administer medications as ordered. Support use of nonpharmacological methods to help control pain such as distraction, imagery, relaxation, and application of heat and cold.  Collaborate with interdisciplinary team and patient to determine appropriate pain management plan.    1. Include patient in decisions related to comfort.  2. Offer non-pharmacological pain management interventions.  3. Report ineffective pain management to physician.  Outcome: Progressing  Goal: Patient vital signs are stable  Description: 1. Assess vital signs - vaginal delivery.  Outcome: Progressing     Problem: BIRTH - VAGINAL/ SECTION  Goal: Fetal and maternal status remain reassuring during the birth process  Description: INTERVENTIONS:  - Monitor vital signs  - Monitor fetal heart rate  - Monitor uterine activity  - Monitor labor progression (vaginal delivery)  - DVT prophylaxis  - Antibiotic prophylaxis  Outcome: Progressing  Goal: Emotionally satisfying birthing experience for  mother/fetus  Description: Interventions:  - Assess, plan, implement and evaluate the nursing care given to the patient in labor  - Advocate the philosophy that each childbirth experience is a unique experience and support the family's chosen level of involvement and control during the labor process   - Actively participate in both the patient's and family's teaching of the birth process  - Consider cultural, Religion and age-specific factors and plan care for the patient in labor  Outcome: Progressing     Problem: PAIN - ADULT  Goal: Verbalizes/displays adequate comfort level or baseline comfort level  Description: Interventions:  - Encourage patient to monitor pain and request assistance  - Assess pain using appropriate pain scale  - Administer analgesics based on type and severity of pain and evaluate response  - Implement non-pharmacological measures as appropriate and evaluate response  - Consider cultural and social influences on pain and pain management  - Notify physician/advanced practitioner if interventions unsuccessful or patient reports new pain  Outcome: Progressing     Problem: INFECTION - ADULT  Goal: Absence or prevention of progression during hospitalization  Description: INTERVENTIONS:  - Assess and monitor for signs and symptoms of infection  - Monitor lab/diagnostic results  - Monitor all insertion sites, i.e. indwelling lines, tubes, and drains  - Monitor endotracheal if appropriate and nasal secretions for changes in amount and color  - [place] appropriate cooling/warming therapies per order  - Administer medications as ordered  - Instruct and encourage patient and family to use good hand hygiene technique  - Identify and instruct in appropriate isolation precautions for identified infection/condition  Outcome: Progressing  Goal: Absence of fever/infection during neutropenic period  Description: INTERVENTIONS:  - Monitor WBC    Outcome: Progressing     Problem: SAFETY ADULT  Goal: Patient  will remain free of falls  Description: INTERVENTIONS:  - Educate patient/family on patient safety including physical limitations  - Instruct patient to call for assistance with activity   - Consult OT/PT to assist with strengthening/mobility   - Keep Call bell within reach  - Keep bed low and locked with side rails adjusted as appropriate  - Keep care items and personal belongings within reach  - Initiate and maintain comfort rounds  - Make Fall Risk Sign visible to staff  - Offer Toileting every  Hours, in advance of need  - Initiate/Maintain alarm  - Obtain necessary fall risk management equipment:  - Apply yellow socks and bracelet for high fall risk patients  - Consider moving patient to room near nurses station  Outcome: Progressing  Goal: Maintain or return to baseline ADL function  Description: INTERVENTIONS:  -  Assess patient's ability to carry out ADLs; assess patient's baseline for ADL function and identify physical deficits which impact ability to perform ADLs (bathing, care of mouth/teeth, toileting, grooming, dressing, etc.)  - Assess/evaluate cause of self-care deficits   - Assess range of motion  - Assess patient's mobility; develop plan if impaired  - Assess patient's need for assistive devices and provide as appropriate  - Encourage maximum independence but intervene and supervise when necessary  - Involve family in performance of ADLs  - Assess for home care needs following discharge   - Consider OT consult to assist with ADL evaluation and planning for discharge  - Provide patient education as appropriate  Outcome: Progressing  Goal: Maintains/Returns to pre admission functional level  Description: INTERVENTIONS:  - Perform AM-PAC 6 Click Basic Mobility/ Daily Activity assessment daily.  - Set and communicate daily mobility goal to care team and patient/family/caregiver.   - Collaborate with rehabilitation services on mobility goals if consulted  - Perform Range of Motion  times a day.  -  Reposition patient every  hours.  - Dangle patient  times a day  - Stand patient  times a day  - Ambulate patient  times a day  - Out of bed to chair  times a day   - Out of bed for meal times a day  - Out of bed for toileting  - Record patient progress and toleration of activity level   Outcome: Progressing     Problem: DISCHARGE PLANNING  Goal: Discharge to home or other facility with appropriate resources  Description: INTERVENTIONS:  - Identify barriers to discharge w/patient and caregiver  - Arrange for needed discharge resources and transportation as appropriate  - Identify discharge learning needs (meds, wound care, etc.)  - Arrange for interpretive services to assist at discharge as needed  - Refer to Case Management Department for coordinating discharge planning if the patient needs post-hospital services based on physician/advanced practitioner order or complex needs related to functional status, cognitive ability, or social support system  Outcome: Progressing

## 2024-10-30 NOTE — OB LABOR/OXYTOCIN SAFETY PROGRESS
Oxytocin Safety Progress Check Note - Va Lujan 34 y.o. female MRN: [id number]    Unit/Bed#: -01 Encounter: [id number]    Dose (adolfo-units/min) Oxytocin: 0 adolfo-units/min  Contraction Frequency (minutes): 1.5-5  Contraction Intensity: Moderate/Strong  Uterine Activity Characteristics: Tachysystole  Cervical Dilation: 4        Cervical Effacement: 90  Fetal Station: -1  Baseline Rate (FHR): 135 bpm  Fetal Heart Rate (FHT): 134 BPM  FHR Category: 1     Provider Notified: Yes  Provider Name: Dr Edmonds      Vital Signs:   Vitals:    10/30/24 0623   BP:    Pulse: 94   Resp:    Temp:    SpO2: 97%       Notes/comments:   Amniotomy completed with clear fluid appreciated. SVE 4/90/-1. FHT category 1. IUPC placed for contraction monitoring with plan to restart pitocin titration as indicated.  aware.     Braulio Bah MD 10/30/2024 6:47 AM

## 2024-10-30 NOTE — OB LABOR/OXYTOCIN SAFETY PROGRESS
Labor Progress Note - Va Lujan 34 y.o. female MRN: [id number]    Unit/Bed#: -01 Encounter: [id number]       Contraction Frequency (minutes): 1.5-3  Contraction Intensity: Moderate  Uterine Activity Characteristics: Tachysystole  Cervical Dilation: Closed        Cervical Effacement: 0  Fetal Station: Ballotable  Baseline Rate (FHR): 135 bpm  Fetal Heart Rate (FHT): 133 BPM  FHR Category: II, prolonged late deceleration               Vital Signs:   Vitals:    10/29/24 2147   BP: 114/83   Pulse: 71   Resp:    Temp:    SpO2:        Notes/comments:   SVE as above. S/p one dose vaginal cytotec. Contractions q1-3 minutes. FHT Cat II for 3 minute late deceleration with yary in the 80s; return to baseline 145 bpm with maternal repositioning. Will discuss with Dr. Do.     Lisa Edmonds MD 10/29/2024 10:03 PM

## 2024-10-30 NOTE — ANESTHESIA PREPROCEDURE EVALUATION
Procedure:  LABOR ANALGESIA    Relevant Problems   GYN   (+) 41 weeks gestation of pregnancy        Lab Results   Component Value Date    WBC 8.62 10/29/2024    HGB 13.1 10/29/2024    HCT 38.4 10/29/2024    MCV 94 10/29/2024     10/29/2024     Lab Results   Component Value Date    SODIUM 140 09/12/2023    K 4.4 09/12/2023     09/12/2023    CO2 29 09/12/2023    BUN 15 09/12/2023    CREATININE 0.80 09/12/2023    GLUC 95 12/07/2022    CALCIUM 9.4 09/12/2023         Physical Exam    Airway    Mallampati score: II  TM Distance: >3 FB  Neck ROM: full     Dental   No notable dental hx     Cardiovascular  Rhythm: regular, Rate: normal    Pulmonary  Pulmonary exam normal Breath sounds clear to auscultation    Other Findings  post-pubertal.      Anesthesia Plan  ASA Score- 1     Anesthesia Type- epidural with ASA Monitors.         Additional Monitors:     Airway Plan:            Plan Factors-Exercise tolerance (METS): >4 METS.    Chart reviewed.   Existing labs reviewed. Patient summary reviewed.    Patient is not a current smoker.  Patient did not smoke on day of surgery.            Induction-     Postoperative Plan-     Perioperative Resuscitation Plan - Level 1 - Full Code.       Informed Consent- Anesthetic plan and risks discussed with patient.  I personally reviewed this patient with the CRNA. Discussed and agreed on the Anesthesia Plan with the CRNA..

## 2024-10-30 NOTE — OB LABOR/OXYTOCIN SAFETY PROGRESS
Oxytocin Safety Progress Check Note - Va Lujan 34 y.o. female MRN: [id number]    Unit/Bed#: -01 Encounter: [id number]    Dose (adolfo-units/min) Oxytocin: 2 adolfo-units/min  Contraction Frequency (minutes): 3-5  Contraction Intensity: Mild  Uterine Activity Characteristics: Tachysystole  Cervical Dilation: 6        Cervical Effacement: 90  Fetal Station: 0  Baseline Rate (FHR): 145 bpm  Fetal Heart Rate (FHT): 149 BPM  FHR Category: II, intermittent late decelerations     Provider Notified: Yes  Provider Name: Dr Edmonds      Vital Signs:   Vitals:    10/30/24 1751   BP: (!) 86/60   Pulse: (!) 109   Resp:    Temp:    SpO2:        Notes/comments:   SVE as above. FHT Cat II for intermittent late decelerations that resolve with maternal repositioning. IUPC replaced. Continue pitocin titration. Plan to repeat SVE in 2 hours or sooner if clinically indicated. Dr. Gale aware.     Lisa Edmonds MD 10/30/2024 6:13 PM

## 2024-10-30 NOTE — OB LABOR/OXYTOCIN SAFETY PROGRESS
Oxytocin Safety Progress Check Note - Va Lujan 34 y.o. female MRN: [id number]    Unit/Bed#: -01 Encounter: [id number]    Dose (adolfo-units/min) Oxytocin: 0 adolfo-units/min  Contraction Frequency (minutes): 3.5-5  Contraction Intensity: Moderate/Strong  Uterine Activity Characteristics: Tachysystole  Cervical Dilation: 3-4        Cervical Effacement: 70  Fetal Station: -1  Baseline Rate (FHR): 140 bpm  Fetal Heart Rate (FHT): 142 BPM  FHR Category: 1     Provider Notified: Yes  Provider Name: Dr Edmonds      Vital Signs:   Vitals:    10/30/24 0600   BP:    Pulse: 88   Resp:    Temp:    SpO2:        Notes/comments:   ZACKE 3-4/70/-1. Taut membranes appreciated. FHT category 1. Plan to restart pitocin. Amniotomy when able. Dr. Do aware.    Braulio Bah MD 10/30/2024 6:15 AM

## 2024-10-30 NOTE — OB LABOR/OXYTOCIN SAFETY PROGRESS
Oxytocin Safety Progress Check Note - Va Lujan 34 y.o. female MRN: [id number]    Unit/Bed#: -01 Encounter: [id number]    Dose (adolfo-units/min) Oxytocin: 4 adolfo-units/min  Contraction Frequency (minutes): 2  Contraction Intensity: Mild  Uterine Activity Characteristics: Regular  Cervical Dilation: 4-5        Cervical Effacement: 90  Fetal Station: 0  Baseline Rate (FHR): 140 bpm  Fetal Heart Rate (FHT): 141 BPM  FHR Category: 1     Provider Notified: Yes  Provider Name: Dr Edmonds      Vital Signs:   Vitals:    10/30/24 1510   BP:    Pulse:    Resp:    Temp: 98.5 °F (36.9 °C)   SpO2:        Notes/comments:   SVE deferred at this time. Considering rupture status, will recheck in about 2 hours. FHT cat 1. Twan every 2 minutes.       Rene Montes De Oca MD 10/30/2024 3:17 PM

## 2024-10-30 NOTE — OB LABOR/OXYTOCIN SAFETY PROGRESS
Labor Progress Note - Va Lujan 34 y.o. female MRN: [id number]    Unit/Bed#: -01 Encounter: [id number]       Contraction Frequency (minutes): 1-3  Contraction Intensity: Moderate/Strong  Uterine Activity Characteristics: Tachysystole  Cervical Dilation: 1        Cervical Effacement: 70  Fetal Station: -1  Baseline Rate (FHR): 135 bpm  Fetal Heart Rate (FHT): 135 BPM  FHR Category: 1     Provider Notified: Yes  Provider Name: Dr Edmonds      Vital Signs:   Vitals:    10/30/24 0100   BP:    Pulse: 96   Resp:    Temp:    SpO2:        Notes/comments:   PROCEDURE:  AGUILLON BALLOON PLACEMENT    A 24F aguillon with a 30cc balloon was selected, SVE was performed and cervix was located, aguillon was introduced over sterile gloved hands. Balloon advanced through cervix beyond the internal cervical os. A small amount amount of sterile saline solution was instilled in the balloon to confirm placement. Placement was confirmed to be beyond the internal cervical os. A total of 60cc of sterile saline solution was placed into the balloon. Pt tolerated well. Instructions left with RN to place aguillon to gravity with a 1L bag of IV fluid. Notify MD when aguillon dislodged.    MD Braulio Drew MD 10/30/2024 1:14 AM

## 2024-10-31 PROBLEM — Z98.891 S/P PRIMARY LOW TRANSVERSE C-SECTION: Status: ACTIVE | Noted: 2024-10-31

## 2024-10-31 LAB
ALBUMIN SERPL BCG-MCNC: 2.5 G/DL (ref 3.5–5)
ALBUMIN SERPL BCG-MCNC: 2.7 G/DL (ref 3.5–5)
ALP SERPL-CCNC: 108 U/L (ref 34–104)
ALP SERPL-CCNC: 128 U/L (ref 34–104)
ALT SERPL W P-5'-P-CCNC: 6 U/L (ref 7–52)
ALT SERPL W P-5'-P-CCNC: 7 U/L (ref 7–52)
ANION GAP SERPL CALCULATED.3IONS-SCNC: 10 MMOL/L (ref 4–13)
ANION GAP SERPL CALCULATED.3IONS-SCNC: 5 MMOL/L (ref 4–13)
AST SERPL W P-5'-P-CCNC: 18 U/L (ref 13–39)
AST SERPL W P-5'-P-CCNC: 21 U/L (ref 13–39)
BASE EXCESS BLDCOA CALC-SCNC: -5.8 MMOL/L (ref 3–11)
BASE EXCESS BLDCOV CALC-SCNC: -6.4 MMOL/L (ref 1–9)
BILIRUB SERPL-MCNC: 0.51 MG/DL (ref 0.2–1)
BILIRUB SERPL-MCNC: 0.74 MG/DL (ref 0.2–1)
BUN SERPL-MCNC: 13 MG/DL (ref 5–25)
BUN SERPL-MCNC: 15 MG/DL (ref 5–25)
CALCIUM ALBUM COR SERPL-MCNC: 8.5 MG/DL (ref 8.3–10.1)
CALCIUM ALBUM COR SERPL-MCNC: 8.8 MG/DL (ref 8.3–10.1)
CALCIUM SERPL-MCNC: 7.5 MG/DL (ref 8.4–10.2)
CALCIUM SERPL-MCNC: 7.6 MG/DL (ref 8.4–10.2)
CHLORIDE SERPL-SCNC: 103 MMOL/L (ref 96–108)
CHLORIDE SERPL-SCNC: 105 MMOL/L (ref 96–108)
CO2 SERPL-SCNC: 18 MMOL/L (ref 21–32)
CO2 SERPL-SCNC: 21 MMOL/L (ref 21–32)
CREAT SERPL-MCNC: 0.92 MG/DL (ref 0.6–1.3)
CREAT SERPL-MCNC: 1.41 MG/DL (ref 0.6–1.3)
ERYTHROCYTE [DISTWIDTH] IN BLOOD BY AUTOMATED COUNT: 13 % (ref 11.6–15.1)
GFR SERPL CREATININE-BSD FRML MDRD: 48 ML/MIN/1.73SQ M
GFR SERPL CREATININE-BSD FRML MDRD: 82 ML/MIN/1.73SQ M
GLUCOSE SERPL-MCNC: 109 MG/DL (ref 65–140)
GLUCOSE SERPL-MCNC: 111 MG/DL (ref 65–140)
HCO3 BLDCOA-SCNC: 20 MMOL/L (ref 17.3–27.3)
HCO3 BLDCOV-SCNC: 18.5 MMOL/L (ref 12.2–28.6)
HCT VFR BLD AUTO: 33.3 % (ref 34.8–46.1)
HGB BLD-MCNC: 11.5 G/DL (ref 11.5–15.4)
MCH RBC QN AUTO: 31.9 PG (ref 26.8–34.3)
MCHC RBC AUTO-ENTMCNC: 34.5 G/DL (ref 31.4–37.4)
MCV RBC AUTO: 92 FL (ref 82–98)
O2 CT VFR BLDCOA CALC: 8.3 ML/DL
OXYHGB MFR BLDCOA: 37.6 %
OXYHGB MFR BLDCOV: 51.1 %
PCO2 BLDCOA: 40.1 MM[HG] (ref 30–60)
PCO2 BLDCOV: 35.7 MM HG (ref 27–43)
PH BLDCOA: 7.32 [PH] (ref 7.23–7.43)
PH BLDCOV: 7.33 [PH] (ref 7.19–7.49)
PLATELET # BLD AUTO: 142 THOUSANDS/UL (ref 149–390)
PMV BLD AUTO: 9.6 FL (ref 8.9–12.7)
PO2 BLDCOA: 18.5 MM HG (ref 5–25)
PO2 BLDCOV: 23.2 MM HG (ref 15–45)
POTASSIUM SERPL-SCNC: 3.9 MMOL/L (ref 3.5–5.3)
POTASSIUM SERPL-SCNC: 4.3 MMOL/L (ref 3.5–5.3)
PROT SERPL-MCNC: 4.7 G/DL (ref 6.4–8.4)
PROT SERPL-MCNC: 5 G/DL (ref 6.4–8.4)
RBC # BLD AUTO: 3.61 MILLION/UL (ref 3.81–5.12)
SAO2 % BLDCOV: 11.7 ML/DL
SODIUM SERPL-SCNC: 131 MMOL/L (ref 135–147)
SODIUM SERPL-SCNC: 131 MMOL/L (ref 135–147)
WBC # BLD AUTO: 20.8 THOUSAND/UL (ref 4.31–10.16)

## 2024-10-31 PROCEDURE — 59510 CESAREAN DELIVERY: CPT | Performed by: OBSTETRICS & GYNECOLOGY

## 2024-10-31 PROCEDURE — 80053 COMPREHEN METABOLIC PANEL: CPT

## 2024-10-31 PROCEDURE — 94762 N-INVAS EAR/PLS OXIMTRY CONT: CPT

## 2024-10-31 PROCEDURE — 80053 COMPREHEN METABOLIC PANEL: CPT | Performed by: NURSE ANESTHETIST, CERTIFIED REGISTERED

## 2024-10-31 PROCEDURE — 85027 COMPLETE CBC AUTOMATED: CPT

## 2024-10-31 PROCEDURE — 82805 BLOOD GASES W/O2 SATURATION: CPT | Performed by: OBSTETRICS & GYNECOLOGY

## 2024-10-31 RX ORDER — DIPHENHYDRAMINE HCL 25 MG
25 TABLET ORAL EVERY 6 HOURS PRN
Status: DISCONTINUED | OUTPATIENT
Start: 2024-10-31 | End: 2024-11-02 | Stop reason: HOSPADM

## 2024-10-31 RX ORDER — DEXAMETHASONE SODIUM PHOSPHATE 10 MG/ML
INJECTION, SOLUTION INTRAMUSCULAR; INTRAVENOUS AS NEEDED
Status: DISCONTINUED | OUTPATIENT
Start: 2024-10-31 | End: 2024-10-31

## 2024-10-31 RX ORDER — MORPHINE SULFATE 0.5 MG/ML
INJECTION, SOLUTION EPIDURAL; INTRATHECAL; INTRAVENOUS AS NEEDED
Status: DISCONTINUED | OUTPATIENT
Start: 2024-10-31 | End: 2024-10-31

## 2024-10-31 RX ORDER — DOCUSATE SODIUM 100 MG/1
100 CAPSULE, LIQUID FILLED ORAL 2 TIMES DAILY
Status: DISCONTINUED | OUTPATIENT
Start: 2024-10-31 | End: 2024-11-02 | Stop reason: HOSPADM

## 2024-10-31 RX ORDER — ONDANSETRON 2 MG/ML
4 INJECTION INTRAMUSCULAR; INTRAVENOUS EVERY 8 HOURS PRN
Status: DISCONTINUED | OUTPATIENT
Start: 2024-10-31 | End: 2024-11-02 | Stop reason: HOSPADM

## 2024-10-31 RX ORDER — SIMETHICONE 80 MG
80 TABLET,CHEWABLE ORAL 4 TIMES DAILY PRN
Status: DISCONTINUED | OUTPATIENT
Start: 2024-10-31 | End: 2024-11-02 | Stop reason: HOSPADM

## 2024-10-31 RX ORDER — OXYTOCIN/RINGER'S LACTATE 30/500 ML
62.5 PLASTIC BAG, INJECTION (ML) INTRAVENOUS ONCE
Status: DISCONTINUED | OUTPATIENT
Start: 2024-10-31 | End: 2024-11-02 | Stop reason: HOSPADM

## 2024-10-31 RX ORDER — ACETAMINOPHEN 325 MG/1
650 TABLET ORAL EVERY 6 HOURS SCHEDULED
Status: DISCONTINUED | OUTPATIENT
Start: 2024-10-31 | End: 2024-11-02 | Stop reason: HOSPADM

## 2024-10-31 RX ORDER — OXYTOCIN/RINGER'S LACTATE 30/500 ML
PLASTIC BAG, INJECTION (ML) INTRAVENOUS AS NEEDED
Status: DISCONTINUED | OUTPATIENT
Start: 2024-10-31 | End: 2024-10-31

## 2024-10-31 RX ORDER — OXYCODONE HYDROCHLORIDE 5 MG/1
5 TABLET ORAL EVERY 4 HOURS PRN
Status: DISCONTINUED | OUTPATIENT
Start: 2024-11-01 | End: 2024-11-02 | Stop reason: HOSPADM

## 2024-10-31 RX ORDER — CALCIUM CARBONATE 500 MG/1
1000 TABLET, CHEWABLE ORAL DAILY PRN
Status: DISCONTINUED | OUTPATIENT
Start: 2024-10-31 | End: 2024-11-02 | Stop reason: HOSPADM

## 2024-10-31 RX ORDER — OXYCODONE HYDROCHLORIDE 10 MG/1
10 TABLET ORAL EVERY 4 HOURS PRN
Status: DISCONTINUED | OUTPATIENT
Start: 2024-11-01 | End: 2024-11-02 | Stop reason: HOSPADM

## 2024-10-31 RX ORDER — LIDOCAINE HCL/EPINEPHRINE/PF 2%-1:200K
VIAL (ML) INJECTION AS NEEDED
Status: DISCONTINUED | OUTPATIENT
Start: 2024-10-31 | End: 2024-10-31

## 2024-10-31 RX ORDER — CEFAZOLIN SODIUM 1 G/50ML
1000 SOLUTION INTRAVENOUS ONCE
Status: COMPLETED | OUTPATIENT
Start: 2024-10-31 | End: 2024-10-31

## 2024-10-31 RX ORDER — METHYLERGONOVINE MALEATE 0.2 MG/ML
INJECTION INTRAVENOUS AS NEEDED
Status: DISCONTINUED | OUTPATIENT
Start: 2024-10-31 | End: 2024-10-31

## 2024-10-31 RX ORDER — FENTANYL CITRATE 50 UG/ML
INJECTION, SOLUTION INTRAMUSCULAR; INTRAVENOUS AS NEEDED
Status: DISCONTINUED | OUTPATIENT
Start: 2024-10-31 | End: 2024-10-31

## 2024-10-31 RX ORDER — NALOXONE HYDROCHLORIDE 0.4 MG/ML
0.1 INJECTION, SOLUTION INTRAMUSCULAR; INTRAVENOUS; SUBCUTANEOUS
Status: ACTIVE | OUTPATIENT
Start: 2024-10-31 | End: 2024-11-01

## 2024-10-31 RX ORDER — OXYCODONE HYDROCHLORIDE 5 MG/1
5 TABLET ORAL ONCE AS NEEDED
Status: COMPLETED | OUTPATIENT
Start: 2024-10-31 | End: 2024-10-31

## 2024-10-31 RX ORDER — KETOROLAC TROMETHAMINE 30 MG/ML
INJECTION, SOLUTION INTRAMUSCULAR; INTRAVENOUS AS NEEDED
Status: DISCONTINUED | OUTPATIENT
Start: 2024-10-31 | End: 2024-10-31

## 2024-10-31 RX ADMIN — CEFAZOLIN SODIUM 1000 MG: 1 SOLUTION INTRAVENOUS at 01:35

## 2024-10-31 RX ADMIN — METHYLERGONOVINE MALEATE 0.2 MG: 0.2 INJECTION INTRAVENOUS at 02:04

## 2024-10-31 RX ADMIN — MORPHINE SULFATE 3 MG: 0.5 INJECTION, SOLUTION EPIDURAL; INTRATHECAL; INTRAVENOUS at 02:11

## 2024-10-31 RX ADMIN — DOCUSATE SODIUM 100 MG: 100 CAPSULE, LIQUID FILLED ORAL at 16:54

## 2024-10-31 RX ADMIN — AZITHROMYCIN MONOHYDRATE 500 MG: 500 INJECTION, POWDER, LYOPHILIZED, FOR SOLUTION INTRAVENOUS at 01:40

## 2024-10-31 RX ADMIN — ONDANSETRON 4 MG: 2 INJECTION INTRAMUSCULAR; INTRAVENOUS at 01:33

## 2024-10-31 RX ADMIN — LIDOCAINE HYDROCHLORIDE AND EPINEPHRINE 3 ML: 20; 5 INJECTION, SOLUTION EPIDURAL; INFILTRATION; INTRACAUDAL; PERINEURAL at 01:34

## 2024-10-31 RX ADMIN — ACETAMINOPHEN 650 MG: 325 TABLET, FILM COATED ORAL at 19:59

## 2024-10-31 RX ADMIN — LIDOCAINE HYDROCHLORIDE AND EPINEPHRINE 3 ML: 20; 5 INJECTION, SOLUTION EPIDURAL; INFILTRATION; INTRACAUDAL; PERINEURAL at 01:31

## 2024-10-31 RX ADMIN — ACETAMINOPHEN 650 MG: 325 TABLET, FILM COATED ORAL at 06:10

## 2024-10-31 RX ADMIN — OXYCODONE HYDROCHLORIDE 5 MG: 5 TABLET ORAL at 20:14

## 2024-10-31 RX ADMIN — KETOROLAC TROMETHAMINE 15 MG: 30 INJECTION, SOLUTION INTRAMUSCULAR; INTRAVENOUS at 02:14

## 2024-10-31 RX ADMIN — LIDOCAINE HYDROCHLORIDE AND EPINEPHRINE 3 ML: 20; 5 INJECTION, SOLUTION EPIDURAL; INFILTRATION; INTRACAUDAL; PERINEURAL at 01:40

## 2024-10-31 RX ADMIN — Medication 250 UNITS: at 01:52

## 2024-10-31 RX ADMIN — DOCUSATE SODIUM 100 MG: 100 CAPSULE, LIQUID FILLED ORAL at 10:41

## 2024-10-31 RX ADMIN — Medication 1 TABLET: at 10:41

## 2024-10-31 RX ADMIN — DEXAMETHASONE SODIUM PHOSPHATE 10 MG: 10 INJECTION, SOLUTION INTRAMUSCULAR; INTRAVENOUS at 01:42

## 2024-10-31 RX ADMIN — PHENYLEPHRINE HYDROCHLORIDE 20 MCG/MIN: 50 INJECTION INTRAVENOUS at 01:35

## 2024-10-31 RX ADMIN — FENTANYL CITRATE 25 MCG: 50 INJECTION INTRAMUSCULAR; INTRAVENOUS at 01:33

## 2024-10-31 NOTE — DISCHARGE SUMMARY
Discharge Summary - OB/GYN   Va Lujan 34 y.o. female MRN: [id number]  Unit/Bed#: -01 Encounter: [id number]    Admission Date: 10/29/2024     Discharge Date: 24    Admitting Diagnosis:   1. Pregnancy at 41w4d  2. Induction of labor in late term    Discharge Diagnosis:   1. S/p 1LTCS  2. Acute kidney injury    Procedures: primary  section, low transverse incision    Delivering Attending: Madie Gale MD  Discharge Attending: Dr. Ratliff    [facility] Course:   Va uLjan is a 34 y.o.  at 41w4d wks who was initially admitted for induction of labor at late term. Induction management was started with Hawkins balloon and cytotec placement with pitocin to follow. She received an epidural for analgesia. Amniotomy was performed with clear fluid appreciated. Labor course was complicated by intermittent variable decelerations with spontaneous return to baseline with IV fluids and maternal repositioning. IUPC was placed for contraction monitoring and pitocin titration. Patient made progress to 5cm dilation. After continued monitoring for intermittent variable deceleration on the FHT and continued latent phase of labor, patient requested  section for expedited delivery. Risks and benefits of surgical management were discussed with the patient. Patient was mobilized to the OR for 1LTCS. See operative report for details.    She delivered a viable male  on 10/31/24 at 0150. Weight 7lbs 10.9oz via primary  section, low transverse incision. Apgars were 2 (1 min) and 9 (5 min).  was transferred to  nursery. Patient tolerated the procedure well and was transferred to recovery in stable condition.     Her post-operative course was complicated by acute kidney injury found on CMP. Creatinine was improving by time of discharge. Preoperative hemoglobin was 13.1, postoperative was 11.5. Her postoperative pain was well controlled with oral analgesics.    On day  of discharge, she was ambulating and able to reasonably perform all ADLs. She was voiding and had appropriate bowel function. She was discharged home on post-operative day #2 without complications. Patient was instructed to follow up with her OB as an outpatient and was given appropriate warnings to call provider if she develops signs of infection or uncontrolled pain.    Complications: none apparent    Condition at discharge: good     Discharge instructions/Information to patient and family:   See after visit summary for information provided to patient and family.      Provisions for Follow-Up Care:  See after visit summary for information related to follow-up care and any pertinent home health orders.      Disposition: Home    Planned Readmission: No    Discharge Medications:  For a complete list of the patient's medications, please refer to her med rec.    Judi Bolden MD  OBGYN PGY-1  11/07/24  9:17 AM

## 2024-10-31 NOTE — OB LABOR/OXYTOCIN SAFETY PROGRESS
Labor Progress Note - Va Lujan 34 y.o. female MRN: [id number]    Unit/Bed#: -01 Encounter: [id number]    Dose (adolfo-units/min) Oxytocin: 4 adolfo-units/min  Contraction Frequency (minutes): 2-3  Contraction Intensity: Moderate  Uterine Activity Characteristics: Regular  Cervical Dilation: 5        Cervical Effacement: 80  Fetal Station: -1  Baseline Rate (FHR): 145 bpm  Fetal Heart Rate (FHT): 141 BPM  FHR Category: 2 secondary to intermittent late decelerations and prolonged deceleration             Vital Signs:   Vitals:    10/30/24 2115   BP: 111/85   Pulse:    Resp:    Temp: 98.2 °F (36.8 °C)   SpO2:        Notes/comments:      On my cervical exam she is 5/80/-1, likely still in latent labor. Reviewed that this indicated that she has not transitioned into active labor and that the expected rate of cervical change is much slower in latent labor. We also reviewed intermittent category 2 tracing along with prolonged deceleration and that if we are unable to titrate pitocin, then would recommend primary .     Madie Gale MD 10/30/2024 9:51 PM

## 2024-10-31 NOTE — OB LABOR/OXYTOCIN SAFETY PROGRESS
Oxytocin Safety Progress Check Note - Va Lujan 34 y.o. female MRN: [id number]    Unit/Bed#: -01 Encounter: [id number]    Dose (adolfo-units/min) Oxytocin: 0 adolfo-units/min (pt requesting LTCS)  Contraction Frequency (minutes): 2  Contraction Intensity: Moderate  Uterine Activity Characteristics: Irregular  Cervical Dilation: 5        Cervical Effacement: 80  Fetal Station: -1  Baseline Rate (FHR): 145 bpm  Fetal Heart Rate (FHT): 147 BPM  FHR Category: II     Provider Notified: Yes  Provider Name: Dr Edmonds      Vital Signs:   Vitals:    10/31/24 0045   BP: 117/66   Pulse: 88   Resp:    Temp:    SpO2: 99%       Notes/comments:   Late entry due to patient care. FHT Cat II for intermittent late decelerations. SVE unchanged. Maternal repositioning and fluid bolus given. Dr. Gale aware.     Lisa Edmonds MD 10/31/2024 12:53 AM

## 2024-10-31 NOTE — QUICK NOTE
Patient with recurrent lates for about 30 minutes that resolved with position changes. Subsequently had intermittent late decelerations. SVE unchanged. Patient elects for a  at this time. She is frustrated with the labor progress. She had had ROM for 18 membranes on pitocin and is not in active labor with an intermittent category 2 tracing and it is reasonable to proceed with . Reviewed implication on future deliveries and risks including bleeding, infection, injury to surrounding structures including injury to bowel bladder, and injury to baby.

## 2024-10-31 NOTE — LACTATION NOTE
This note was copied from a baby's chart.  CONSULT - LACTATION  Baby Boy (Va) Le 0 days male MRN: [id number]    [facility] NURSERY Room / Bed: (N)/(N) Encounter: [id number]    Maternal Information     MOTHER:  Va Lujan  Maternal Age: 34 y.o.  OB History: # 1 - Date: 10/31/24, Sex: Male, Weight: 3485 g (7 lb 10.9 oz), GA: 41w4d, Type: , Low Transverse, Apgar1: 2, Apgar5: 9, Living: Living, Birth Comments: None   Previouse breast reduction surgery? No    Lactation history:   Has patient previously breast fed: No   How long had patient previously breast fed:     Previous breast feeding complications:       Past Surgical History:   Procedure Laterality Date    WISDOM TOOTH EXTRACTION         Birth information:  YOB: 2023   Time of birth: 1:50 AM   Sex: male   Delivery type: , Low Transverse   Birth Weight: 3485 g (7 lb 10.9 oz)   Percent of Weight Change: 0%     Gestational Age: 41w4d      10/31/24 1300   Lactation Consultation   Reason for Consult 20   Maternal Information   Has mother  before? No   Breasts/Nipples   Breastfeeding Progress Not yet established   Breast Pump   Pump 3  (Has Motif)   Patient Follow-Up   Lactation Consult Status 2   Follow-Up Type Inpatient;Call as needed   Other OB Lactation Documentation    Additional Problem Noted Masood was taken to  nursery by pediatrician. Family had intent of breastfeeding exclusively. Education provided on donor breast milk. Discussed use of SNS at the breast to start Masood latching to the breast if family still desires supplementation without use of bottles/nipples. Discussed breast stimulation via hand expression or breast pump if baby is not latching and using supplementation. Enc to call when baby is ready to feed after proceedure. Enc skin to skin.  (Reviewed RSB and D/C booklet (at bedside).)       Feeding recommendations:  breast feed on  demand    Met with Dyad. Provided  with Ready, Set, Baby booklet which contained information on:  Hand expression with access to QR codes to review hand expression.  Positioning and latch reviewed as well as showing images of other feeding positions.  Discussed the properties of a good latch in any position.   Feeding on cue and what that means for recognizing infant's hunger, s/s that baby is getting enough milk and some s/s that breastfeeding dyad may need further help  Skin to Skin contact and benefits to mom and baby  Avoidance of pacifiers for the first month discussed.   Gave information on common concerns, what to expect the first few weeks after delivery, preparing for other caregivers, and how partners can help. Resources for support also provided.    Discussed AAP recommendation of breastfeeding for 2 years. First six months being exclusively breast milk.    Encouraged parents to call for assistance, questions, and concerns about breastfeeding.  Extension provided.      Tracy Lee RN 10/31/2024 2:00 PM

## 2024-10-31 NOTE — UTILIZATION REVIEW
Initial Clinical Review    Admission: Date/Time/Statement:   Admission Orders (From admission, onward)       Ordered        10/29/24 1542  Inpatient Admission  Once                          Orders Placed This Encounter   Procedures    Inpatient Admission     Standing Status:   Standing     Number of Occurrences:   1     Order Specific Question:   Level of Care     Answer:   Med Surg [16]     Order Specific Question:   Estimated length of stay     Answer:   More than 2 Midnights     Order Specific Question:   Certification     Answer:   I certify that inpatient services are medically necessary for this patient for a duration of greater than two midnights. See H&P and MD Progress Notes for additional information about the patient's course of treatment.       Chief Complaint   Patient presents with    Scheduled Induction       Initial Presentation: 34 y.o. female presented to L&D as inpatient admission for IOL. G 1 @ 41w5d. Plan continuous external monitoring, IVF,cytotec blankenship balloon, IV pitocin if needed Epidural and IV MGSO 4 if needed and supportive care   SVE: 0/0/-4  FHT:  Baseline of 130/moderate variability/15 x 15 accels present/no decelerations. Reactive tracing.  Clinical EFW: 26th percentile ; Cephalic confirmed by ultrasound    @ 2203  Cytotex inserted  Contraction Frequency (minutes): 1.5-3  Contraction Intensity: Moderate  Uterine Activity Characteristics: Tachysystole  Cervical Dilation: Closed  Cervical Effacement: 0  Fetal Station: Ballotable  Baseline Rate (FHR): 135 bpm  Fetal Heart Rate (FHT): 133 BPM  FHR Category: II, prolonged late deceleration    @ 2350  Blankenship balloon inserted   Contraction Frequency (minutes): 1.5-5  Contraction Intensity: Moderate, Moderate/Strong  Uterine Activity Characteristics: Tachysystole  Cervical Dilation: 1  Cervical Effacement: 50  Fetal Station: -3  Baseline Rate (FHR): 140 bpm  Fetal Heart Rate (FHT): 139 BPM  FHR Category: 1    10-30-24  @ 0023  Epidural     @  0114  Contraction Frequency (minutes): 1-3  Contraction Intensity: Moderate/Strong  Uterine Activity Characteristics: Tachysystole  Cervical Dilation: 1  Cervical Effacement: 70  Fetal Station: -1  Baseline Rate (FHR): 135 bpm  Fetal Heart Rate (FHT): 135 BPM  FHR Category: 1    @ 0318  Starting IV Pitocin  Hawkins expelled  Contraction Frequency (minutes): 1.5-4  Contraction Intensity: Moderate, Moderate/Strong  Uterine Activity Characteristics: Tachysystole  Cervical Dilation: 3  Cervical Effacement: 70  Fetal Station: -1  Baseline Rate (FHR): 140 bpm  Fetal Heart Rate (FHT): 143 BPM  FHR Category: I    @ 0615  Dose (adolfo-units/min) Oxytocin: 0 adolfo-units/min  Contraction Frequency (minutes): 3.5-5  Contraction Intensity: Moderate/Strong  Uterine Activity Characteristics: Tachysystole  Cervical Dilation: 3-4  Cervical Effacement: 70  Fetal Station: -1  Baseline Rate (FHR): 140 bpm  Fetal Heart Rate (FHT): 142 BPM  FHR Category: 1    @ 0647  AROM clear fluid  Dose (adolfo-units/min) Oxytocin: 0 adolfo-units/min  Contraction Frequency (minutes): 1.5-5  Contraction Intensity: Moderate/Strong  Uterine Activity Characteristics: Tachysystole  Cervical Dilation: 4  Cervical Effacement: 90  Fetal Station: -1  Baseline Rate (FHR): 135 bpm  Fetal Heart Rate (FHT): 134 BPM  FHR Category: 1    @ 1254  Dose (adolfo-units/min) Oxytocin: 4 adolfo-units/min  Contraction Frequency (minutes): 1-4  Contraction Intensity: Mild  Uterine Activity Characteristics: Coupling  Cervical Dilation: 4-5  Cervical Effacement: 90  Fetal Station: 0  Baseline Rate (FHR): 140 bpm  Fetal Heart Rate (FHT): 141 BPM  FHR Category: 2    @ 1813  Dose (adolfo-units/min) Oxytocin: 2 adolfo-units/min  Contraction Frequency (minutes): 3-5  Contraction Intensity: Mild  Uterine Activity Characteristics: Tachysystole  Cervical Dilation: 6  Cervical Effacement: 90  Fetal Station: 0  Baseline Rate (FHR): 145 bpm  Fetal Heart Rate (FHT): 149 BPM  FHR Category: II,  intermittent late decelerations    @ 2151  Dose (adolfo-units/min) Oxytocin: 4 adolfo-units/min  Contraction Frequency (minutes): 2-3  Contraction Intensity: Moderate  Uterine Activity Characteristics: Regular  Cervical Dilation: 5  Cervical Effacement: 80  Fetal Station: -1  Baseline Rate (FHR): 145 bpm  Fetal Heart Rate (FHT): 141 BPM  FHR Category: 2 secondary to intermittent late decelerations and prolonged deceleration    10-31-24  @ 0053  Dose (adolfo-units/min) Oxytocin: 0 adolfo-units/min (pt requesting LTCS)  Contraction Frequency (minutes): 2  Contraction Intensity: Moderate  Uterine Activity Characteristics: Irregular  Cervical Dilation: 5  Cervical Effacement: 80  Fetal Station: -1  Baseline Rate (FHR): 145 bpm  Fetal Heart Rate (FHT): 147 BPM  FHR Category: II  FHT Cat II for intermittent late decelerations       24 @ 41W 4 D    MALE @ 0150  APGAR 2/9  WT  3485  Infant dried suctioned stimulated and warmed. Responded well and taken to NBN       Scheduled Medications:  acetaminophen, 650 mg, Oral, Q6H PETER  docusate sodium, 100 mg, Oral, BID  oxytocin, 62.5 adolfo-units/min, Intravenous, Once  prenatal multivitamin, 1 tablet, Oral, Daily      Continuous IV Infusions:  lactated ringers, 125 mL/hr, Intravenous, Continuous      PRN Meds:  calcium carbonate, 1,000 mg, Oral, Daily PRN  diphenhydrAMINE, 25 mg, Oral, Q6H PRN  naloxone, 0.1 mg, Intravenous, Q3 min PRN  ondansetron, 4 mg, Intravenous, Q8H PRN  [START ON 2024] oxyCODONE, 10 mg, Oral, Q4H PRN  [START ON 2024] oxyCODONE, 5 mg, Oral, Q4H PRN  simethicone, 80 mg, Oral, 4x Daily PRN      Admitting  Vitals   Temperature Pulse Respirations Blood Pressure SpO2 Pain Score   10/29/24 1609 10/29/24 1609 10/29/24 1609 10/29/24 1609 10/29/24 1903 10/29/24 1609   98 °F (36.7 °C) 102 18 111/72 99 % No Pain     Weight (last 2 days)       Date/Time Weight    10/30/24 2042 --    Comment rows:    OBSERV: Dr. Edmonds at bedside to perform SVE at  10/30/24 2042    10/30/24 0211 --    Comment rows:    OBSERV: crna in to evaluate pt pulse, pulse ranging from 80's to 120 while in room pulse went to 130's pt asymptomatic, per William crna no new orders, will monitor, Dr Edmonds aware crna aware of pulse rate at 10/30/24 0211    10/30/24 0200 --    Comment rows:    OBSERV: tension applied to blankenship , blankenship remains in, pt tolerated well at 10/30/24 0200    10/30/24 0130 --    Comment rows:    OBSERV: tension applied to blankenship, tape to pt leg, pt tolerate well at 10/30/24 0130    10/30/24 0058 --    Comment rows:    OBSERV: blankenship balloon place by Dr Bah, pt tolerate well, at 10/30/24 0058    10/30/24 0015 --    Comment rows:    OBSERV: test dose done for epidural, pt asymptomatic post dose at 10/30/24 0015    10/30/24 0008 --    Comment rows:    OBSERV: time out done  for epidural at 10/30/24 0008    10/30/24 0000 --    Comment rows:    OBSERV: crna in room for epidural  epidural at 10/30/24 0000    10/29/24 2208 --    Comment rows:    OBSERV: Dr Edmonds aware decrease iv fluids to 125 ml/hr at 10/29/24 2208    10/29/24 1609 53.5 (118)            Vital Signs (last 3 days)       Date/Time Temp Pulse Resp BP MAP (mmHg) SpO2 O2 Device Cardiac (WDL) Pain    10/31/24 0801 -- -- -- -- -- -- -- WDL No Pain    10/31/24 0800 98.7 °F (37.1 °C) 80 18 108/80 -- 97 % -- -- 4    10/31/24 0700 99.3 °F (37.4 °C) 78 18 108/82 -- 97 % -- -- 4    10/31/24 0610 -- -- -- -- -- -- -- -- 3    10/31/24 0602 99.4 °F (37.4 °C) 83 18 -- -- 97 % -- -- --    10/31/24 0553 -- -- -- -- -- 95 % None (Room air) -- --    10/31/24 0458 98.4 °F (36.9 °C) 83 18 117/88 -- 97 % -- -- No Pain    10/31/24 0420 -- 82 -- -- -- 98 % -- -- --    10/31/24 0415 -- 83 -- 106/78 87 97 % None (Room air) -- 3    10/31/24 0400 -- 90 -- 111/77 90 94 % None (Room air) -- --    10/31/24 0345 -- 83 -- 108/78 87 98 % None (Room air) -- 2    10/31/24 0330 98.1 °F (36.7 °C) 95 -- 102/72 83 95 % None (Room air) -- 4    10/31/24 0315  -- 93 -- 105/73 85 94 % None (Room air) -- No Pain    10/31/24 0300 97.9 °F (36.6 °C) -- -- 105/73 -- 97 % None (Room air) WDL No Pain    10/31/24 0115 -- 90 -- 88/62 -- -- -- -- --    10/31/24 0100 -- 86 -- 103/71 -- -- -- -- --    10/31/24 0048 -- 83 -- -- -- 98 % -- -- --    10/31/24 0045 -- 88 -- 117/66 -- 99 % -- -- --    10/31/24 0030 98.7 °F (37.1 °C) 79 -- 116/80 -- 99 % -- -- --    10/31/24 0015 -- 83 -- 113/75 -- 99 % -- -- --    10/31/24 0005 -- 87 -- 112/87 -- 100 % -- -- --    10/31/24 0000 -- 81 -- 112/87 -- 99 % -- -- --    10/30/24 2345 -- 89 -- 108/82 -- -- -- -- --    10/30/24 2330 98.6 °F (37 °C) 92 -- 98/67 -- -- -- -- --    10/30/24 2315 -- 90 -- 94/67 -- -- -- -- --    10/30/24 2300 -- 88 -- 102/65 -- -- -- -- --    10/30/24 2233 97.9 °F (36.6 °C) 90 -- 101/64 -- -- -- -- --    10/30/24 2130 -- -- -- 102/69 -- -- -- -- --    10/30/24 2115 98.2 °F (36.8 °C) -- -- 111/85 -- -- -- -- No Pain    10/30/24 2100 -- -- -- 107/74 -- -- -- -- --    10/30/24 2042 -- -- -- -- -- -- -- -- --    OBSERV: Dr. Edmonds at bedside to perform SVE at 10/30/24 2042    10/30/24 2037 -- 88 -- 81/50 -- -- -- -- --    10/30/24 2024 -- -- -- 90/63 -- -- -- -- --    10/30/24 2015 98 °F (36.7 °C) -- -- 96/64 -- -- -- -- --    10/30/24 2000 -- -- -- 95/65 -- -- -- -- --    10/30/24 1945 -- -- -- 105/66 -- -- -- -- --    10/30/24 1930 -- -- -- 98/67 -- -- -- -- --    10/30/24 1913 -- -- -- 106/72 -- -- -- -- --    10/30/24 1903 97.7 °F (36.5 °C) -- -- -- -- -- -- -- No Pain    10/30/24 1851 -- 78 -- 113/82 -- -- -- -- --    10/30/24 1836 -- 85 -- 105/78 -- -- -- -- --    10/30/24 1822 -- 100 -- 80/61 -- -- -- -- --    10/30/24 1818 -- -- -- -- -- -- -- -- Med Not Given for Pain - for MAR use only    10/30/24 1814 100.3 °F (37.9 °C) -- -- -- -- -- -- -- --    10/30/24 1804 -- 88 -- 102/69 -- -- -- -- --    10/30/24 1751 -- 109 -- 86/60 -- -- -- -- --    10/30/24 1737 -- 92 -- 97/62 -- -- -- -- --    10/30/24 1722 -- 93 -- 100/70  -- -- -- -- --    10/30/24 1707 99.5 °F (37.5 °C) 93 -- 98/69 -- -- -- -- --    10/30/24 1652 -- 93 -- 94/70 -- -- -- -- --    10/30/24 1650 99.5 °F (37.5 °C) -- -- -- -- -- -- -- --    10/30/24 1637 -- 86 -- 94/70 -- -- -- -- --    10/30/24 1624 -- 102 -- 88/65 -- -- -- -- --    10/30/24 1615 99.3 °F (37.4 °C) -- -- -- -- -- -- -- --    10/30/24 1608 -- 93 -- 99/69 -- -- -- -- --    10/30/24 1552 -- 88 -- 99/63 -- -- -- -- --    10/30/24 1537 -- 86 -- 110/68 -- -- -- -- --    10/30/24 1524 -- 85 -- 108/73 -- -- -- -- --    10/30/24 1510 98.5 °F (36.9 °C) -- -- -- -- -- -- -- --    10/30/24 1452 -- 80 -- 97/69 -- -- -- -- --    10/30/24 1437 -- 81 -- 99/66 -- -- -- -- --    10/30/24 1422 -- 81 -- 95/64 -- -- -- -- --    10/30/24 1407 98.4 °F (36.9 °C) 80 -- 94/66 -- -- -- -- --    10/30/24 1353 -- 79 -- 96/65 -- -- -- -- --    10/30/24 1338 -- 81 -- 94/64 -- -- -- -- --    10/30/24 1321 -- 77 -- 97/63 -- -- -- -- --    10/30/24 1307 -- 81 -- 97/67 -- -- -- -- --    10/30/24 1303 97.8 °F (36.6 °C) -- -- -- -- -- -- -- --    10/30/24 1221 -- 83 -- 107/70 -- -- -- -- --    10/30/24 1211 98.4 °F (36.9 °C) -- -- -- -- -- -- -- --    10/30/24 1153 -- 82 -- 96/63 -- -- -- -- --    10/30/24 1138 -- 80 -- 96/64 -- -- -- -- --    10/30/24 1122 -- 82 -- 101/65 -- -- -- -- --    10/30/24 1107 -- 82 -- 99/64 -- -- -- -- --    10/30/24 1037 -- 80 -- 107/75 -- -- -- -- --    10/30/24 1031 98.1 °F (36.7 °C) -- -- -- -- -- -- -- --    10/30/24 1021 -- 82 -- 106/83 -- -- -- -- --    10/30/24 1006 -- 90 -- 103/78 -- -- -- -- --    10/30/24 0952 -- 88 -- 108/82 -- -- -- -- --    10/30/24 0939 98.5 °F (36.9 °C) -- -- -- -- -- -- -- --    10/30/24 0937 -- 87 -- 99/77 -- -- -- -- --    10/30/24 0921 -- 88 -- 91/62 -- -- -- -- --    10/30/24 0906 -- 88 -- 93/63 -- -- -- -- --    10/30/24 0856 -- 85 -- 91/59 -- -- -- -- --    10/30/24 0851 -- 86 -- 85/54 -- -- -- -- --    10/30/24 0837 97.8 °F (36.6 °C) 86 -- 94/58 -- -- -- -- --     10/30/24 0830 -- -- -- -- -- -- None (Room air) -- No Pain    10/30/24 0822 -- 107 -- 90/56 -- -- -- -- --    10/30/24 0807 -- 88 -- 109/61 -- -- -- -- --    10/30/24 0751 97.7 °F (36.5 °C) 79 -- 100/70 -- -- -- -- --    10/30/24 0737 -- 80 -- 107/68 -- -- -- -- --    10/30/24 0721 -- 73 -- 100/68 -- -- -- -- --    10/30/24 0708 -- 84 -- -- -- 97 % -- -- --    10/30/24 0706 -- 79 -- 99/64 -- -- -- -- --    10/30/24 0704 -- 78 -- -- -- -- -- -- --    10/30/24 0703 -- 74 -- -- -- 97 % -- -- --    10/30/24 0700 -- 78 -- -- -- -- -- -- --    10/30/24 0658 -- 79 -- -- -- 98 % -- -- --    10/30/24 0656 -- 80 -- -- -- -- -- -- --    10/30/24 0653 -- 82 -- -- -- 98 % -- -- --    10/30/24 0652 -- 81 -- -- -- -- -- -- --    10/30/24 0651 -- 75 -- 97/65 -- -- -- -- --    10/30/24 0648 97.8 °F (36.6 °C) 86 18 -- -- 99 % -- -- --    10/30/24 0645 -- 95 -- 92/56 -- 99 % -- -- --    10/30/24 0644 -- 95 -- -- -- -- -- -- --    10/30/24 0643 -- 105 -- -- -- 99 % -- -- --    10/30/24 0640 -- 86 -- -- -- -- -- -- --    10/30/24 0623 -- 94 -- -- -- 97 % -- -- --    10/30/24 0620 -- 97 -- -- -- -- -- -- --    10/30/24 0618 98.2 °F (36.8 °C) 88 18 108/66 -- 97 % -- -- --    10/30/24 0616 -- 93 -- -- -- -- -- -- --    10/30/24 0613 -- 87 -- -- -- 99 % -- -- --    10/30/24 0612 -- 91 -- -- -- -- -- -- --    10/30/24 0600 -- 88 -- -- -- -- -- -- No Pain    10/30/24 0558 -- 89 -- -- -- 97 % -- -- --    10/30/24 0556 -- 81 -- -- -- -- -- -- --    10/30/24 0553 -- 87 -- -- -- 97 % -- -- --    10/30/24 0552 -- 85 -- 93/62 -- -- -- -- --    10/30/24 0548 -- 83 -- -- -- 97 % -- -- --    10/30/24 0545 -- 90 -- -- -- 97 % -- -- --    10/30/24 0543 -- 91 -- -- -- -- -- -- --    10/30/24 0541 -- 81 -- -- -- -- -- -- --    10/30/24 0540 -- 88 -- 100/67 -- 98 % -- -- --    10/30/24 0536 -- 92 -- -- -- -- -- -- --    10/30/24 0533 -- 84 -- -- -- 98 % -- -- --    10/30/24 0532 -- 94 -- -- -- -- -- -- --    10/30/24 0530 -- 90 -- -- -- 97 % -- -- --     10/30/24 0524 -- 83 -- -- -- -- -- -- --    10/30/24 0523 -- 95 -- 101/71 -- 97 % -- -- --    10/30/24 0520 -- 88 -- -- -- -- -- -- --    10/30/24 0518 -- 92 -- -- -- 97 % -- -- --    10/30/24 0516 -- 89 -- -- -- -- -- -- --    10/30/24 0513 -- 97 -- -- -- 98 % -- -- --    10/30/24 0512 -- 88 -- -- -- -- -- -- --    10/30/24 0509 -- 83 -- 105/73 -- 97 % -- -- --    10/30/24 0507 -- 84 -- -- -- -- -- -- --    10/30/24 0506 -- 82 -- -- -- -- -- -- --    10/30/24 0504 -- 85 -- -- -- 97 % -- -- --    10/30/24 0500 -- 85 -- -- -- 97 % -- -- --    10/30/24 0458 -- 82 -- -- -- -- -- -- --    10/30/24 0456 -- 85 -- -- -- -- -- -- --    10/30/24 0453 -- 81 -- -- -- 97 % -- -- --    10/30/24 0452 -- 83 -- 107/68 -- -- -- -- --    10/30/24 0450 -- 82 -- -- -- -- -- -- --    10/30/24 0448 -- 81 -- -- -- 97 % -- -- --    10/30/24 0446 98.6 °F (37 °C) 85 18 -- -- -- -- -- No Pain    10/30/24 0443 -- 83 -- -- -- 97 % -- -- --    10/30/24 0442 -- 78 -- 99/71 -- -- -- -- --    10/30/24 0440 -- 89 -- -- -- -- -- -- --    10/30/24 0439 -- 83 -- 88/53 -- -- -- -- --    10/30/24 0438 -- 97 -- -- -- 98 % -- -- --    10/30/24 0437 -- 85 -- 89/55 -- -- -- -- --    10/30/24 0436 -- 88 -- -- -- -- -- -- --    10/30/24 0433 -- 89 -- -- -- 97 % -- -- --    10/30/24 0432 -- 91 -- -- -- -- -- -- --    10/30/24 0428 -- 91 -- 94/58 -- 98 % -- -- --    10/30/24 0424 -- 89 -- -- -- -- -- -- --    10/30/24 0423 -- 92 -- -- -- 98 % -- -- --    10/30/24 0421 -- 89 -- -- -- -- -- -- --    10/30/24 0418 -- 89 -- -- -- 97 % -- -- --    10/30/24 0416 -- 84 -- -- -- -- -- -- --    10/30/24 0413 -- 80 -- -- -- 98 % -- -- --    10/30/24 0412 -- 86 -- -- -- -- -- -- --    10/30/24 0408 -- 86 -- -- -- 98 % -- -- --    10/30/24 0406 -- 86 -- -- -- -- -- -- --    10/30/24 0404 -- 88 -- 107/79 -- -- -- -- --    10/30/24 0403 -- 87 -- -- -- 98 % -- -- --    10/30/24 0400 -- 94 -- -- -- -- -- -- --    10/30/24 0358 -- 90 -- -- -- 98 % -- -- --    10/30/24 0356 -- 88  -- -- -- -- -- -- --    10/30/24 0353 -- 89 -- -- -- 98 % -- -- --    10/30/24 0352 -- 89 -- -- -- -- -- -- --    10/30/24 0351 -- 87 -- 107/76 -- -- -- -- --    10/30/24 0350 -- -- -- -- -- -- -- -- No Pain    10/30/24 0348 98.1 °F (36.7 °C) 88 18 -- -- 99 % -- -- No Pain    10/30/24 0344 -- 87 -- -- -- -- -- -- --    10/30/24 0343 -- 85 -- -- -- 98 % -- -- --    10/30/24 0340 -- 87 -- -- -- -- -- -- --    10/30/24 0338 -- 92 -- -- -- 98 % -- -- --    10/30/24 0336 -- 81 -- 110/74 -- -- -- -- --    10/30/24 0333 -- 101 -- -- -- 97 % -- -- --    10/30/24 0332 -- 94 -- -- -- -- -- -- --    10/30/24 0328 -- 89 -- -- -- 99 % -- -- --    10/30/24 0324 -- 92 -- -- -- -- -- -- --    10/30/24 0323 -- 84 -- -- -- 98 % -- -- --    10/30/24 0321 -- 80 -- 107/67 -- -- -- -- --    10/30/24 0320 -- 84 -- -- -- -- -- -- --    10/30/24 0318 -- 100 -- -- -- 100 % -- -- --    10/30/24 0316 -- 106 -- -- -- -- -- -- --    10/30/24 0313 -- 104 -- -- -- 99 % -- -- --    10/30/24 0312 -- 93 -- -- -- -- -- -- --    10/30/24 0308 -- 95 -- -- -- 98 % -- -- --    10/30/24 0306 -- 86 -- 105/64 -- -- -- -- --    10/30/24 0304 -- 110 -- -- -- -- -- -- --    10/30/24 0303 -- 95 -- -- -- 98 % -- -- --    10/30/24 0300 -- 92 -- -- -- -- -- -- --    10/30/24 0258 -- 103 -- -- -- 99 % -- -- --    10/30/24 0256 98.3 °F (36.8 °C) 102 16 -- -- -- -- -- No Pain    10/30/24 0253 -- 90 -- -- -- 98 % -- -- --    10/30/24 0252 -- 102 -- 109/63 -- -- -- -- --    10/30/24 0250 -- -- 16 -- -- -- -- -- No Pain    10/30/24 0248 -- 92 -- -- -- 97 % -- -- --    10/30/24 0244 -- 86 -- -- -- -- -- -- --    10/30/24 0243 -- 99 -- -- -- 98 % -- -- --    10/30/24 0240 -- 88 -- -- -- -- -- -- --    10/30/24 0238 -- 85 -- -- -- 98 % -- -- --    10/30/24 0237 -- 106 -- 98/57 -- -- -- -- --    10/30/24 0236 -- 103 -- -- -- -- -- -- --    10/30/24 0233 -- 97 -- -- -- 98 % -- -- --    10/30/24 0232 -- 89 -- -- -- -- -- -- --    10/30/24 0230 -- 89 -- 100/72 -- 99 % -- -- --     10/30/24 0228 -- 92 -- -- -- 99 % -- -- --    10/30/24 0224 -- 103 -- -- -- -- -- -- --    10/30/24 0223 -- 92 -- -- -- 98 % -- -- --    10/30/24 0221 -- 102 -- -- -- -- -- -- --    10/30/24 0220 -- 87 -- -- -- -- -- -- --    10/30/24 0218 -- 87 -- -- -- 98 % -- -- --    10/30/24 0216 -- 93 -- -- -- -- -- -- --    10/30/24 0213 -- 90 -- -- -- 98 % -- -- --    10/30/24 0212 -- 92 -- -- -- -- -- -- --    10/30/24 0211 -- -- -- -- -- -- -- -- --    OBSERV: crna in to evaluate pt pulse, pulse ranging from 80's to 120 while in room pulse went to 130's pt asymptomatic, per William garcias no new orders, will monitor, Dr [place] crna aware of pulse rate at 10/30/24 0211    10/30/24 0208 -- 88 -- 108/70 -- 99 % -- -- --    10/30/24 0204 -- 88 -- -- -- -- -- -- --    10/30/24 0203 98.4 °F (36.9 °C) 90 18 -- -- 99 % -- -- --    10/30/24 0200 -- 97 -- -- -- -- -- -- --    OBSERV: tension applied to pattie blankenship remains in, pt tolerated well at 10/30/24 0200    10/30/24 0158 -- 93 -- -- -- 98 % -- -- --    10/30/24 0156 -- 90 -- -- -- -- -- -- --    10/30/24 0153 -- 91 -- 109/76 -- 98 % -- -- --    10/30/24 0152 -- 84 -- -- -- -- -- -- --    10/30/24 0149 -- 109 -- -- -- 98 % -- -- No Pain    10/30/24 0144 -- 90 -- -- -- -- -- -- --    10/30/24 0143 -- 92 -- -- -- 98 % -- -- --    10/30/24 0140 -- 102 -- -- -- -- -- -- --    10/30/24 0138 -- 95 -- 108/69 -- 98 % -- -- --    10/30/24 0136 -- 88 -- -- -- -- -- -- --    10/30/24 0133 -- 90 -- -- -- 100 % -- -- --    10/30/24 0132 -- 88 -- -- -- -- -- -- --    10/30/24 0130 -- -- -- -- -- -- -- -- --    OBSERV: tension applied to blankenship, tape to pt leg, pt tolerate well at 10/30/24 0130    10/30/24 0128 -- 104 -- -- -- 98 % -- -- --    10/30/24 0124 -- 115 -- -- -- -- -- -- --    10/30/24 0123 -- 93 -- -- -- 98 % -- -- --    10/30/24 0122 -- 94 -- 98/66 -- -- -- -- --    10/30/24 0120 -- 95 -- -- -- -- -- -- --    10/30/24 0118 -- 94 -- -- -- 99 % -- -- --    10/30/24 0116 -- 92 --  -- -- -- -- -- --    10/30/24 0115 -- -- -- -- -- -- -- -- No Pain    10/30/24 0113 -- 83 -- -- -- 98 % -- -- --    10/30/24 0112 -- 104 -- -- -- -- -- -- --    10/30/24 0108 -- 84 -- -- -- 98 % -- -- --    10/30/24 0107 -- 95 -- 91/62 -- -- -- -- --    10/30/24 0104 -- 98 -- -- -- -- -- -- --    10/30/24 0103 -- 93 -- -- -- 99 % -- -- --    10/30/24 0100 97.8 °F (36.6 °C) 96 18 -- -- -- -- -- --    10/30/24 0058 -- 95 -- -- -- 98 % -- -- --    OBSERV: blankenship balloon place by Dr Bah, pt tolerate well, at 10/30/24 0058    10/30/24 0056 -- 97 -- -- -- -- -- -- --    10/30/24 0053 -- 113 -- -- -- 98 % -- -- --    10/30/24 0052 -- 94 -- -- -- -- -- -- --    10/30/24 0050 -- 96 -- 93/63 -- -- -- -- No Pain    10/30/24 0048 -- 93 -- -- -- 98 % -- -- --    10/30/24 0047 -- 89 -- 96/65 -- -- -- -- --    10/30/24 0044 -- 90 -- 101/68 -- -- -- -- --    10/30/24 0043 -- 95 -- -- -- 97 % -- -- --    10/30/24 0041 -- 84 -- 96/60 -- -- -- -- --    10/30/24 0040 -- 95 -- -- -- -- -- -- --    10/30/24 0038 -- 88 -- 97/62 -- 98 % -- -- --    10/30/24 0037 -- 85 -- 92/63 -- -- -- -- --    10/30/24 0035 -- 99 -- -- -- -- -- -- --    10/30/24 0033 -- 91 -- -- -- 97 % -- -- --    10/30/24 0032 -- 92 -- 95/63 -- -- -- -- --    10/30/24 0029 -- 75 -- 98/64 -- -- -- -- --    10/30/24 0028 -- 88 -- -- -- -- -- -- --    10/30/24 0026 -- 92 -- 99/67 -- 98 % -- -- --    10/30/24 0024 -- 83 -- -- -- -- -- -- --    10/30/24 0023 -- 87 -- 107/69 -- 99 % -- -- --    10/30/24 0021 -- 80 -- 114/79 -- -- -- -- --    10/30/24 0020 -- 89 -- -- -- -- None (Room air) -- 3    10/30/24 0018 -- 89 -- -- -- 99 % -- -- --    10/30/24 0017 -- 71 -- 112/76 -- -- -- -- --    10/30/24 0016 -- 84 -- -- -- -- -- -- --    10/30/24 0015 -- -- -- -- -- -- -- -- --    OBSERV: test dose done for epidural, pt asymptomatic post dose at 10/30/24 0015    10/30/24 0014 -- 99 -- -- -- -- -- -- --    10/30/24 0013 -- 93 -- 124/70 -- 98 % -- -- --    10/30/24 0008 -- -- -- --  -- -- -- -- --    OBSERV: time out done  for epidural at 10/30/24 0008    10/30/24 0006 -- 89 -- -- -- 99 % -- -- --    10/30/24 0004 -- -- -- 104/70 -- -- -- -- --    10/30/24 0000 -- -- -- -- -- -- -- -- --    OBSERV: crna in room for epidural  epidural at 10/30/24 0000    10/29/24 2349 -- -- -- -- -- -- -- -- 6    10/29/24 2345 -- 78 -- 117/83 -- -- -- -- --    10/29/24 2335 98.3 °F (36.8 °C) 83 20 -- -- 99 % -- -- 6    10/29/24 2330 -- 77 -- 106/74 -- -- -- -- --    10/29/24 2315 -- 80 -- 96/68 -- -- -- -- --    10/29/24 2302 -- 78 -- 102/59 -- -- -- -- --    10/29/24 2245 -- 86 -- 94/64 -- -- -- -- --    10/29/24 2231 -- 88 -- 105/62 -- -- -- -- --    10/29/24 2215 -- 84 -- 104/56 -- -- -- -- --    10/29/24 2208 -- -- -- -- -- -- -- -- --    OBSERV: Dr [place] decrease iv fluids to 125 ml/hr at 10/29/24 2208    10/29/24 2204 -- -- -- -- -- -- -- -- 5    10/29/24 2147 -- 71 -- 114/83 -- -- -- -- --    10/29/24 2131 -- 85 -- 102/64 -- -- -- -- --    10/29/24 2116 -- 78 -- 110/80 -- -- -- -- --    10/29/24 2101 98.6 °F (37 °C) 81 18 107/77 -- -- -- -- 3    10/29/24 2045 -- 75 -- 109/77 -- -- -- -- --    10/29/24 2031 -- 81 -- 114/77 -- -- -- -- --    10/29/24 2017 -- 87 -- 110/77 -- -- -- -- 3    10/29/24 2000 -- 89 -- 107/78 -- -- -- -- --    10/29/24 1945 -- 86 -- 105/71 -- -- -- -- --    10/29/24 1930 -- 99 -- 102/75 -- -- -- -- --    10/29/24 1915 -- 84 -- 107/74 -- -- -- -- --    10/29/24 1910 -- -- -- 106/74 -- -- -- -- --    10/29/24 1903 98.6 °F (37 °C) 86 18 -- -- 99 % -- -- 2    10/29/24 1900 -- 87 -- 106/74 -- -- -- -- --    10/29/24 1845 -- 85 -- 108/75 -- -- -- -- --    10/29/24 1830 -- 90 -- 109/75 -- -- -- -- --    10/29/24 1815 -- 85 -- 107/68 -- -- -- -- --    10/29/24 1800 -- 85 -- 108/74 -- -- -- -- --    10/29/24 1745 -- 93 -- 106/73 -- -- -- -- --    10/29/24 1730 -- 90 -- 105/70 -- -- -- -- --    10/29/24 1715 -- 88 -- 107/73 -- -- -- -- --    10/29/24 1609 98 °F (36.7 °C) 102 18 111/72 --  "-- -- -- No Pain              Pertinent Labs/Diagnostic Test Results:   Radiology:  No orders to display     Cardiology:  No orders to display     GI:  No orders to display           Results from last 7 days   Lab Units 10/31/24  0731 10/29/24  1646   WBC [place]/uL 20.80* 8.62   HEMOGLOBIN g/dL 11.5 13.1   HEMATOCRIT % 33.3* 38.4   PLATELETS Th[place]/uL 142* 192         Results from last 7 days   Lab Units 10/31/24  0731 10/31/24  0224   SODIUM mmol/L 131* 131*   POTASSIUM mmol/L 4.3 3.9   CHLORIDE mmol/L 105 103   CO2 mmol/L 21 18*   ANION GAP mmol/L 5 10   BUN mg/dL 13 15   CREATININE mg/dL 0.92 1.41*   EGFR ml/min/1.73sq m 82 48   CALCIUM mg/dL 7.6* 7.5*     Results from last 7 days   Lab Units 10/31/24  0731 10/31/24  0224   AST U/L 21 18   ALT U/L 7 6*   ALK PHOS U/L 108* 128*   TOTAL PROTEIN g/dL 4.7* 5.0*   ALBUMIN g/dL 2.5* 2.7*   TOTAL BILIRUBIN mg/dL 0.51 0.74         Results from last 7 days   Lab Units 10/31/24  0731 10/31/24  0224   GLUCOSE RANDOM mg/dL 111 109             No results found for: \"BETA-HYDROXYBUTYRATE\"                                                                                                                                         Past Medical History:   Diagnosis Date    Varicella     had chickenpox     Present on Admission:  **None**      Admitting Diagnosis: 41 weeks gestation of pregnancy [O48.0, Z3A.41]  Encounter for induction of labor [Z34.90]  Encounter for  delivery without indication [O82]  Age/Sex: 34 y.o. female    Network Utilization Review Department  ATTENTION: Please call with any questions or concerns to [phone] and carefully listen to the prompts so that you are directed to the right person. All voicemails are confidential.   For Discharge needs, contact Care Management DC Support Team at [phone] opt. 2  Send all requests for admission clinical reviews, approved or denied determinations and any other requests to dedicated fax number below " belonging to the campus where the patient is receiving treatment. List of dedicated fax numbers for the Facilities:  FACILITY NAME UR FAX NUMBER   ADMISSION DENIALS (Administrative/Medical Necessity) [phone]   DISCHARGE SUPPORT TEAM (NETWORK) [phone]   PARENT CHILD HEALTH (Maternity/NICU/Pediatrics) [phone]   [facility] [phone]   [facility] [phone]   [facility] [phone]   [facility] [phone]   [facility] [phone]   [facility] [phone]   [facility] [phone]   [facility] [phone]   [facility] [phone]   Critical access hospital [phone]   [facility] [phone]   [facility] [phone]

## 2024-10-31 NOTE — PLAN OF CARE
Problem: Knowledge Deficit  Goal: Verbalizes understanding of labor plan  Description: Assess patient/family/caregiver's baseline knowledge level and ability to understand information.  Provide education via patient/family/caregiver's preferred learning method at appropriate level of understanding.     1. Provide teaching at level of understanding.  2. Provide teaching via preferred learning method(s).  Outcome: Progressing  Goal: Patient/family/caregiver demonstrates understanding of disease process, treatment plan, medications, and discharge instructions  Description: Complete learning assessment and assess knowledge base.  Interventions:  - Provide teaching at level of understanding  - Provide teaching via preferred learning methods  Outcome: Progressing     Problem: Labor & Delivery  Goal: Manages discomfort  Description: Assess and monitor for signs and symptoms of discomfort.  Assess patient's pain level regularly and per hospital policy.  Administer medications as ordered. Support use of nonpharmacological methods to help control pain such as distraction, imagery, relaxation, and application of heat and cold.  Collaborate with interdisciplinary team and patient to determine appropriate pain management plan.    1. Include patient in decisions related to comfort.  2. Offer non-pharmacological pain management interventions.  3. Report ineffective pain management to physician.  Outcome: Progressing  Goal: Patient vital signs are stable  Description: 1. Assess vital signs - vaginal delivery.  Outcome: Progressing     Problem: BIRTH - VAGINAL/ SECTION  Goal: Fetal and maternal status remain reassuring during the birth process  Description: INTERVENTIONS:  - Monitor vital signs  - Monitor fetal heart rate  - Monitor uterine activity  - Monitor labor progression (vaginal delivery)  - DVT prophylaxis  - Antibiotic prophylaxis  Outcome: Progressing  Goal: Emotionally satisfying birthing experience for  mother/fetus  Description: Interventions:  - Assess, plan, implement and evaluate the nursing care given to the patient in labor  - Advocate the philosophy that each childbirth experience is a unique experience and support the family's chosen level of involvement and control during the labor process   - Actively participate in both the patient's and family's teaching of the birth process  - Consider cultural, Restorationist and age-specific factors and plan care for the patient in labor  Outcome: Progressing     Problem: PAIN - ADULT  Goal: Verbalizes/displays adequate comfort level or baseline comfort level  Description: Interventions:  - Encourage patient to monitor pain and request assistance  - Assess pain using appropriate pain scale  - Administer analgesics based on type and severity of pain and evaluate response  - Implement non-pharmacological measures as appropriate and evaluate response  - Consider cultural and social influences on pain and pain management  - Notify physician/advanced practitioner if interventions unsuccessful or patient reports new pain  Outcome: Progressing     Problem: INFECTION - ADULT  Goal: Absence or prevention of progression during hospitalization  Description: INTERVENTIONS:  - Assess and monitor for signs and symptoms of infection  - Monitor lab/diagnostic results  - Monitor all insertion sites, i.e. indwelling lines, tubes, and drains  - Monitor endotracheal if appropriate and nasal secretions for changes in amount and color  - Darby appropriate cooling/warming therapies per order  - Administer medications as ordered  - Instruct and encourage patient and family to use good hand hygiene technique  - Identify and instruct in appropriate isolation precautions for identified infection/condition  Outcome: Progressing  Goal: Absence of fever/infection during neutropenic period  Description: INTERVENTIONS:  - Monitor WBC    Outcome: Progressing     Problem: SAFETY ADULT  Goal: Patient  will remain free of falls  Description: INTERVENTIONS:  - Educate patient/family on patient safety including physical limitations  - Instruct patient to call for assistance with activity   - Consult OT/PT to assist with strengthening/mobility   - Keep Call bell within reach  - Keep bed low and locked with side rails adjusted as appropriate  - Keep care items and personal belongings within reach  - Initiate and maintain comfort rounds  - Make Fall Risk Sign visible to staff  - Offer Toileting every  Hours, in advance of need  - Initiate/Maintain alarm  - Obtain necessary fall risk management equipment:   - Apply yellow socks and bracelet for high fall risk patients  - Consider moving patient to room near nurses station  Outcome: Progressing  Goal: Maintain or return to baseline ADL function  Description: INTERVENTIONS:  -  Assess patient's ability to carry out ADLs; assess patient's baseline for ADL function and identify physical deficits which impact ability to perform ADLs (bathing, care of mouth/teeth, toileting, grooming, dressing, etc.)  - Assess/evaluate cause of self-care deficits   - Assess range of motion  - Assess patient's mobility; develop plan if impaired  - Assess patient's need for assistive devices and provide as appropriate  - Encourage maximum independence but intervene and supervise when necessary  - Involve family in performance of ADLs  - Assess for home care needs following discharge   - Consider OT consult to assist with ADL evaluation and planning for discharge  - Provide patient education as appropriate  Outcome: Progressing  Goal: Maintains/Returns to pre admission functional level  Description: INTERVENTIONS:  - Perform AM-PAC 6 Click Basic Mobility/ Daily Activity assessment daily.  - Set and communicate daily mobility goal to care team and patient/family/caregiver.   - Collaborate with rehabilitation services on mobility goals if consulted  - Perform Range of Motion  times a day.  -  Reposition patient every  hours.  - Dangle patient  times a day  - Stand patient  times a day  - Ambulate patient  times a day  - Out of bed to chair  times a day   - Out of bed for meals  times a day  - Out of bed for toileting  - Record patient progress and toleration of activity level   Outcome: Progressing     Problem: DISCHARGE PLANNING  Goal: Discharge to home or other facility with appropriate resources  Description: INTERVENTIONS:  - Identify barriers to discharge w/patient and caregiver  - Arrange for needed discharge resources and transportation as appropriate  - Identify discharge learning needs (meds, wound care, etc.)  - Arrange for interpretive services to assist at discharge as needed  - Refer to Case Management Department for coordinating discharge planning if the patient needs post-hospital services based on physician/advanced practitioner order or complex needs related to functional status, cognitive ability, or social support system  Outcome: Progressing

## 2024-10-31 NOTE — OP NOTE
Section Operative Note    Indications:   Elective in labor    Pre-operative Diagnosis:    at 41w4d pregnancy  Maternal request  IOL for late term    Post-operative Diagnosis:   delivered    Bunny Group Classification System:   No Multiple pregnancy, No Transverse or oblique lie, No Breech lie, Gestational age is > or =37 weeks, Nulliparous, Labor induced +  is BUNNY GROUP 2a    Attending: Madie Gale MD  Resident: Braulio Bah, PGY-4    Maternal Findings:  Normal uterus  Normal tubes and ovaries bilaterally  No adhesions  Bilateral rectus muscles hemostatic upon closure  No difficulty noted from skin to delivery     Findings:  Viable male male weighing 7lbs 11oz;  Apgar scores of 2 at one minute and 9 at five minutes. OP presentation.   Thick meconium amniotic fluid  Normal placenta with 3-vessel cord    Arterial and Venous Gases:  Umbilical Cord Venous Blood Gas:    Venous:  pH, Cord Billy  7.190 - 7.490 7.333   pCO2, Cord Billy  27.0 - 43.0 mm HG 35.7   pO2, Cord Billy  15.0 - 45.0 mm HG 23.2   HCO3, Cord Billy  12.2 - 28.6 mmol/L 18.5   Base Exc, Cord Billy  1.0 - 9.0 mmol/L -6.4 Low    O2 Cont, Cord Billy  mL/dL 11.7   O2 HGB,VENOUS CORD  %      Arterial:  pH, Cord Art  7.230 - 7.430 7.315   pCO2, Cord Art  30.0 - 60.0 40.1   pO2, Cord Art  5.0 - 25.0 mm HG 18.5   HCO3, Cord Art  17.3 - 27.3 mmol/L 20.0   Base Exc, Cord Art  3.0 - 11.0 mmol/L -5.8 Low    O2 Content, Cord Art  ml/dl 8.3   O2 Hgb, Arterial Cord  %          Specimens: Arterial and venous cord gases, cord blood, segment of umbilical cord, placenta to storage    Quantitative Blood Loss: 665cc    Drains: Hawkins catheter           Complications:  None; patient tolerated the procedure well.           Disposition: PACU            Condition: stable    Procedure Details   The patient was seen prior to the procedure. Risks, benefits, possible complications, alternate treatment options, and expected outcomes were discussed with the  patient. The patient agreed with the proposed plan and gave informed consent for a pLTCS.      The patient was taken to the OB Operating Room where her epidural was bolused appropriately. For infection prophylaxis, she received 2g ancef and 500 mg of azithromycin preoperatively. Fetal heart tones in the OR were assessed and noted to be within normal limits and Hawkins catheter and SCDs were placed. The abdomen was prepped with Chloraprep, the vagina was prepped with Chlorhexidine, and following appropriate drying time, the patient was draped in the usual sterile manner. A time-out was held and the above information confirmed. The patient was identified as Va Lujan and the procedure verified as a  Delivery.    A Pfannenstiel incision was made and carried down through the underlying subcutaneous tissue to the fascia using a scalpel. The rectus fascia was then nicked in the midline and dissected laterally using Mohan scissors. The superior edge of the fascial incision was grasped with Kocher clamps bilaterally, tented upward and the underlying rectus muscles were dissected off sharply with Mohan scissors. This was repeated on the inferior edge of the fascia and dissected down to the pubic rami.  The rectus muscles were  and the peritoneum was identified, entered, and extended longitudinally with blunt dissection. The bladder blade was inserted. The vesicouterine peritoneum was identified, entered sharply, and dissected laterally with Metzenbaum scissors to form a bladder flap and the bladder was noted to be slightly distended. The bladder blade was repositioned. A low transverse uterine incision was made with the scalpel and extended cephalocaudally with blunt dissection. The amnion was entered bluntly and found to be thick meconium.      The fetal head was palpated, elevated, and delivered through the uterine incision followed by the body without difficulty. Time of birth was noted at 0150.  Initially, there was noted to be good tone, cord was cut and clamped due to 's respiratory effort and handed off to the NICU. There was no apparent injury to the . Arterial and venous cord gases, cord blood, and a segment of umbilical cord were obtained for evaluation. The placenta delivered spontaneously at 0154 with uterine fundal massage and appeared normal. The uterus was exteriorized and cleaned out with a moist lap sponge.     The uterine incision was closed with a running locked suture of 0 Vicryl. A second layer of the 0-monocryl was used to reapproximate the hysterotomy in a running, locked fashion.  There was a small right cervical extension that was also repaired for good hemostasis. The uterus was returned to the abdomen. The paracolic gutters were inspected and cleared of all clots and debris with moist lap sponges. A hemostatic agent Nuknit was placed on the hysterotomy and excellent hemostasis was noted.  Bilateral rectus muscles were observed to be hemostatic during closure. The fascia was closed with a running suture of 0 Vicryl. Subcutaneous adipose tissue was closed with running suture of 2-0 monocryl. The skin was closed with a subcuticular running suture of 4-0 Monocryl. Glue was placed. The uterine fundus was appreciated to be firm at the completion of closure. There was a small amoutn of bleedign and she received 800mcg of methergine and afterwards, minimal vaginal bleeding was appreciated upon fundal pressure.     The patient appeared to tolerate the procedure very well. Lap sponge, needle, and instrument counts were correct x2. The patient's fundus was palpated and the uterus was expressed. She was then cleaned and transferred to her postpartum recovery room in stable condition and her infant went to the  nursery.

## 2024-10-31 NOTE — DISCHARGE INSTRUCTIONS
WHAT YOU NEED TO KNOW:   A , or  section, is abdominal surgery to deliver your baby.  DISCHARGE INSTRUCTIONS:   Call 911 for any of the following:   You feel lightheaded, short of breath, and have chest pain.     You cough up blood.  Seek care immediately if:   Blood soaks through your bandage.     Your stitches come apart.     Your arm or leg feels warm, tender, and painful. It may look swollen and red.  Contact your OB if:   You have heavy vaginal bleeding that fills 1 or more sanitary pads in 1 hour.    You have a fever.     Your incision is swollen, red, or draining pus.     You have questions or concerns about yourself or your baby.  Medicines:  You may  need any of the following:  Prescription pain medicine  may be given. Ask how to take this medicine safely.     Acetaminophen  decreases pain and fever. It is available without a doctor's order. Ask how much to take and how often to take it. Follow directions. Acetaminophen can cause liver damage if not taken correctly.    NSAIDs , such as ibuprofen, help decrease swelling, pain, and fever. NSAIDs can cause stomach bleeding or kidney problems in certain people. If you take blood thinner medicine, always ask your healthcare provider if NSAIDs are safe for you. Always read the medicine label and follow directions.    Take your medicine as directed.  Contact your healthcare provider if you think your medicine is not helping or if you have side effects. Tell him or her if you are allergic to any medicine. Keep a list of the medicines, vitamins, and herbs you take. Include the amounts, and when and why you take them. Bring the list or the pill bottles to follow-up visits. Carry your medicine list with you in case of an emergency.  Wound care:  Carefully wash your wound with soap and water every day. Keep your wound clean and dry. Wear loose, comfortable clothes that do not rub against your wound. Ask your OB about bathing and  showering.  Limit activity as directed:   Ask when it is safe for you to drive, walk up stairs, lift heavy objects, and have sex.     Ask when it is okay to exercise, and what types of exercise to do. Start slowly and do more as you get stronger.  Drink liquids as directed:  Liquids help keep you hydrated after your procedure and decrease your risk for a blood clot. Ask how much liquid to drink each day and which liquids are best for you.   Follow up with your OB as directed:  You may need to return to have your stitches or staples removed. Write down your questions so you remember to ask them during your visits.  © 2017 "[employer]" Information is for End User's use only and may not be sold, redistributed or otherwise used for commercial purposes. All illustrations and images included in CareNotes® are the copyrighted property of [employer]. or [employer].  The above information is an  only. It is not intended as medical advice for individual conditions or treatments. Talk to your doctor, nurse or pharmacist before following any medical regimen to see if it is safe and effective for you.      Postpartum Perineal Care   WHAT YOU NEED TO KNOW:   Postpartum perineal care is care for your perineum after you have a baby. The perineum is your vagina and anus.   DISCHARGE INSTRUCTIONS:   Care for your perineum:  Healthcare providers will give you a small squirt bottle and show you how to use it. Do the following after you use the toilet and before you put on a new pad:  Remove the soiled pad    Use the squirt bottle to rinse your perineum from front to back while you sit on the toilet     Pat the area dry from front to back with toilet paper or a cotton cloth     Put on a fresh pad    Wash your hands  Decrease pain:  Ask your healthcare provider about these and other ways to decrease perineal pain:  Sitz baths:  Healthcare providers may give you a portable sitz bath. This is a  small tub that fits in the toilet. Fill the sitz bath or bathtub with 4 to 6 inches of warm water. Sit in the warm water for 20 minutes 2 to 3 times a day.    Ice:  Ice helps decrease swelling and pain. Ice may also help prevent tissue damage. Use an ice pack, or put crushed ice in a plastic bag. Cover it with a towel and place it on your perineum for 15 to 20 minutes every hour, or as directed.    Medicine spray, wipes, or pads:  Healthcare providers may give you a medicine spray or wipes soaked with numbing medicine to decrease the pain. Pads that contain an herb called witch hazel may also help reduce pain. Use these after perineal care or a sitz bath.  Follow up with your healthcare provider as directed:  Write down your questions so you remember to ask them during your visits.   Contact your healthcare provider if:   You have heavy vaginal bleeding that fills 1 or more sanitary pads in 1 hour.    You have foul-smelling vaginal discharge.    You feel weak or lightheaded.    You have questions or concerns about your condition or care.  Seek care immediately or call 911 if:   You have large blood clots or bright red blood coming from your vagina.    You have abdominal pain, vomiting, and a fever.  © 2017 [employer] Information is for End User's use only and may not be sold, redistributed or otherwise used for commercial purposes. All illustrations and images included in CareNotes® are the copyrighted property of [employer]. or [employer].  The above information is an  only. It is not intended as medical advice for individual conditions or treatments. Talk to your doctor, nurse or pharmacist before following any medical regimen to see if it is safe and effective for you.      Postpartum Depression   WHAT YOU NEED TO KNOW:   What is postpartum depression?  Postpartum depression is a mood disorder that occurs after giving birth. A mood is an emotion or a feeling. Moods  affect your behavior and how you feel about yourself and life in general. Depression is a sad mood that you cannot control. Women often feel sad, afraid, or nervous after their baby is born. These feelings are called postpartum blues or baby blues, and they usually go away in 1 to 2 weeks. With postpartum depression, these symptoms get worse and continue for more than 2 weeks. Postpartum depression is a serious condition that affects your daily activities and relationships.   What causes postpartum depression?  Healthcare providers do not know exactly what causes postpartum depression. It may be caused by a sudden drop in hormone levels after childbirth. A previous episode of postpartum depression or a family history of depression may increase your risk. Several things may trigger postpartum depression:  Lack of support from the baby's father or other family members    Feeling more tired than usual    Stress, a poor diet, or lack of sleep    Pain after childbirth or pain during breastfeeding    Sudden change in lifestyle  How is postpartum depression diagnosed?  Postpartum depression affects your daily activities and your relationships with other people. Healthcare providers will ask you questions about your signs and symptoms and how they are affecting your life. The symptoms of postpartum depression usually begin within 1 month after childbirth. You feel depressed or lose interest in activities you enjoy nearly every day for at least 2 weeks. You also have 4 or more of the following symptoms:  You feel tired or have less energy than usual.     You feel unimportant or guilty most of the time.    You think about hurting or killing yourself.    Your appetite changes. You may lose your appetite and lose weight without trying. Your appetite may also increase and you may gain weight.    You are restless, irritable, or withdrawn.    You have trouble concentrating and remembering things. You have trouble doing daily tasks  or making decisions.    You have trouble sleeping, even after the baby is asleep.  How is postpartum depression treated?   Psychotherapy:  During therapy, you will talk with healthcare providers about how to cope with your feelings and moods. This can be done alone or in a group. It may also be done with family members or your partner.     Antidepressants:  This medicine is given to decrease or stop the symptoms of depression. You usually need to take antidepressants for several weeks before you begin to feel better. Do not stop taking antidepressants unless your healthcare provider tells you to. Healthcare providers may try a different antidepressant if one type does not work.  What can I do to feel better?   Rest:  Do not try to do everything all at the same time. Do only what is needed and let other things wait until later. Ask your family or friends for help, especially if you have other children. Ask your partner to help with night feedings or other baby care. Try to sleep when the baby naps.     Get emotional support:  Share your feelings with your partner, a friend, or another mother.     Take care of yourself:  Shower and dress each day. Do not skip meals. Try to get out of the house a little each day. Get regular exercise. Eat a healthy diet. Avoid alcohol because it can make your depression worse. Do not isolate yourself. Go for a walk or meet with a friend. It is also important that you have some time by yourself each day.  How do I find support and more information?   [employer] ([facility]), Public Information & Communication Branch  [address] [place] [address], [address], [facility] [place] [address]  [place] [address]   Phone: [phone] - [phone]  Phone: [phone] [phone]  Web Address: http://www.[facility].nih.gov/  When should I contact my healthcare provider?   You cannot make it to your next visit.    Your depression does not get better with treatment or it gets worse.     You have questions or  concerns about your condition or care.  When should I seek immediate care or call 911?   You think about hurting or killing yourself, your baby, or someone else.    You feel like other people want to hurt you.     You hear voices telling you to hurt yourself or your baby.  CARE AGREEMENT:   You have the right to help plan your care. Learn about your health condition and how it may be treated. Discuss treatment options with your caregivers to decide what care you want to receive. You always have the right to refuse treatment. The above information is an  only. It is not intended as medical advice for individual conditions or treatments. Talk to your doctor, nurse or pharmacist before following any medical regimen to see if it is safe and effective for you.  ©  [employer] Information is for End User's use only and may not be sold, redistributed or otherwise used for commercial purposes. All illustrations and images included in CareNotes® are the copyrighted property of [employer]. or [employer].      Postpartum Bleeding   WHAT YOU NEED TO KNOW:   Postpartum bleeding is vaginal bleeding after childbirth. This bleeding is normal, whether your baby was born vaginally or by . It contains blood and the tissue that lined the inside of your uterus when you were pregnant.   DISCHARGE INSTRUCTIONS:   What to expect with postpartum bleeding:  Postpartum bleeding usually lasts at least 10 days, and may last longer than 6 weeks. Your bleeding may range from light (barely staining a pad) to heavy (soaking a pad in 1 hour). Usually, you have heavier bleeding right after childbirth, which slows over the next few weeks until it stops. The bleeding is red or dark brown with clots for the first 1 to 3 days. It then turns pink for several days, and then becomes a white or yellow discharge until it ends.  Follow up with your obstetrician as directed:  Do not have sex until your  obstetrician says it is okay. Write down your questions so you remember to ask them during your visits.  Contact your healthcare provider or obstetrician if:   Your bleeding increases, or you have heavy bleeding that soaks a pad in 1 hour for 2 hours in a row.    You pass large blood clots.    You are breathing faster than normal, or your heart is beating faster than normal.    You are urinating less than usual, or not at all.    You feel dizzy.    You have questions or concerns about your condition or care.  Seek immediate care or call 911 if:   You are suddenly short of breath and feel lightheaded.    You have sudden chest pain.  © 2017 [employer] Information is for End User's use only and may not be sold, redistributed or otherwise used for commercial purposes. All illustrations and images included in CareNotes® are the copyrighted property of [employer]. or [employer].  The above information is an  only. It is not intended as medical advice for individual conditions or treatments. Talk to your doctor, nurse or pharmacist before following any medical regimen to see if it is safe and effective for you.      Breast Care for the Breast Feeding Mother   WHAT YOU SHOULD KNOW:   Your breasts will go through normal changes while you are breastfeeding. Sometimes breast and nipple problems can develop while you are breastfeeding. Learn about changes that are normal and those that may be a problem. Breast care can help you prevent and manage problems so you and your baby can enjoy the benefits of breastfeeding.  AFTER YOU LEAVE:   Breast changes while you are breastfeeding:   For the first few days after your baby is born, your body makes a small amount of breast milk (colostrum). Within about 2 to 5 days, your body will begin making mature milk. It may take up to 10 days or longer for mature milk to come in. When your mature milk comes in, your breasts will become full and  firm. They may feel tender.     Breastfeeding your baby will decrease the full feeling in your breasts. You may feel a tingly sensation during feedings as milk is released from your breasts. This is called the milk let-down reflex. After 7 or more days, the fullness may feel like it has decreased. Your nipples should look the same as they did before you started breastfeeding. Breasts that feel full before and empty after breastfeeding are signs that breastfeeding is going well.  Breast problems that can occur while you are breastfeeding:   Nipple soreness  may occur when you begin to breastfeed your baby. You may also have nipple soreness if your baby is not latched on to your breast correctly. Correct positioning and latch-on may decrease or stop the pain in your nipples. Work with your caregivers to help your baby latch on correctly. It may also be helpful to place warm, wet compresses on your nipples to help decrease pain.     Plugged milk ducts  may cause painful breast lumps. Plugged ducts may be caused by not emptying your breasts completely during feedings. When your baby pauses during breastfeeding, massage and gently squeeze your breast. Gentle massage may unplug a blocked milk duct. Pump out any milk left in your breasts after your baby is done breastfeeding. Avoid wearing tight tops, tight bras, or under-wire bras, because they may put pressure on your breasts.    Engorgement  may occur as your milk comes in soon after you begin breastfeeding. Engorgement may cause your breasts to become swollen and painful. Your breasts may also become engorged if you miss a feeding or you do not breastfeed on demand. The best way to decrease engorgement symptoms is to empty your breasts by feeding your baby often. Engorgement can make it hard for your baby to latch on to your breast. If this happens, express a small amount of milk and then have your baby latch on. Cold compresses, gel packs, or ice packs on your breasts  can help decrease pain and swelling. Ask your caregiver how often and how long you should use cold, or ice packs.     A breast infection called mastitis  can develop if you have plugged milk ducts or engorgement. Mastitis causes your breasts to become red, swollen, and painful. You may also have flu-like symptoms, such as chills and a fever. Place heat on your breasts to help decrease the pain. You may want to place a moist, warm cloth on the painful breast or both of your breasts. Ask how often to do this. Your primary healthcare provider (PHP) may suggest that you take an NSAID, such as ibuprofen, to decrease pain and swelling. He may also order antibiotics to treat mastitis. Ask about feeding your baby when you have a breast infection.  How to help prevent or manage breast problems while you are breastfeeding:   Learn how to position your baby and latch him on correctly.  To latch your baby correctly to your breast, make sure that his mouth covers most of your areola (dark area around your nipple). He should not be attached only to the nipple. Your baby is latched on well if you feel comfortable and do not feel pain. A correct latch helps him get enough milk and can help to prevent sore nipples and other breast problems. There are several breastfeeding positions that you can try. Find the position that works best for you and your baby. Ask your caregiver for more information about how to hold and breastfeed your baby.     Prevent biting.  Your baby may get teeth at about 3 to 4 months of age. To help prevent biting, break his suction once he is finished or if he has fallen asleep. To break his suction, slip a finger into the side of his mouth. If your baby bites you, respond with surprise or unhappiness. Offer praise when he does not bite you.     Breastfeed your baby regularly.  Feed your baby 8 to 12 times a day. You may need to wake up your baby at night to feed him. It is okay to feed from 1 or both breasts  at each feeding. Your baby should breastfeed from both breasts equally over the course of a day. If your baby only feeds from 1 side during a feeding, offer your other breast to him first for the next feeding.     Schedule and keep follow-up visits.  Talk to your baby's pediatrician or your PHP during follow-up visits if you have breast problems. Caregivers may suggest that you, or you and your partner, attend classes on breastfeeding. You also may want to join a breastfeeding support group. Caregivers may suggest that you see a lactation consultant. This is a caregiver who can help you with breastfeeding.  Contact your PHP if:   You have a fever and chills.    You have body aches and you feel like you do not have any energy.    One or both of your breasts is red, swollen or hard, painful, and feels warm or hot.    You have breast engorgement that does not get better within 24 hours.     You see or feel a lump in your breast that hurts when you touch it.    You have nipple pain during breastfeeding or between feedings.     Your nipples are red, dry, cracked, or bleeding, or they have scabs on them.     You have questions or concerns about your condition or care.  © 2014 [employer]. Information is for End User's use only and may not be sold, redistributed or otherwise used for commercial purposes. All illustrations and images included in CareNotes® are the copyrighted property of [employer] or [employer].  The above information is an  only. It is not intended as medical advice for individual conditions or treatments. Talk to your doctor, nurse or pharmacist before following any medical regimen to see if it is safe and effective for you.

## 2024-10-31 NOTE — PLAN OF CARE
Problem: Knowledge Deficit  Goal: Verbalizes understanding of labor plan  Description: Assess patient/family/caregiver's baseline knowledge level and ability to understand information.  Provide education via patient/family/caregiver's preferred learning method at appropriate level of understanding.     1. Provide teaching at level of understanding.  2. Provide teaching via preferred learning method(s).  Outcome: Progressing  Goal: Patient/family/caregiver demonstrates understanding of disease process, treatment plan, medications, and discharge instructions  Description: Complete learning assessment and assess knowledge base.  Interventions:  - Provide teaching at level of understanding  - Provide teaching via preferred learning methods  Outcome: Progressing     Problem: PAIN - ADULT  Goal: Verbalizes/displays adequate comfort level or baseline comfort level  Description: Interventions:  - Encourage patient to monitor pain and request assistance  - Assess pain using appropriate pain scale  - Administer analgesics based on type and severity of pain and evaluate response  - Implement non-pharmacological measures as appropriate and evaluate response  - Consider cultural and social influences on pain and pain management  - Notify physician/advanced practitioner if interventions unsuccessful or patient reports new pain  Outcome: Progressing     Problem: INFECTION - ADULT  Goal: Absence or prevention of progression during hospitalization  Description: INTERVENTIONS:  - Assess and monitor for signs and symptoms of infection  - Monitor lab/diagnostic results  - Monitor all insertion sites, i.e. indwelling lines, tubes, and drains  - Monitor endotracheal if appropriate and nasal secretions for changes in amount and color  - [place] appropriate cooling/warming therapies per order  - Administer medications as ordered  - Instruct and encourage patient and family to use good hand hygiene technique  - Identify and instruct in  appropriate isolation precautions for identified infection/condition  Outcome: Progressing  Goal: Absence of fever/infection during neutropenic period  Description: INTERVENTIONS:  - Monitor WBC    Outcome: Progressing     Problem: SAFETY ADULT  Goal: Patient will remain free of falls  Description: INTERVENTIONS:  - Educate patient/family on patient safety including physical limitations  - Instruct patient to call for assistance with activity   - Consult OT/PT to assist with strengthening/mobility   - Keep Call bell within reach  - Keep bed low and locked with side rails adjusted as appropriate  - Keep care items and personal belongings within reach  - Initiate and maintain comfort rounds  - Make Fall Risk Sign visible to staff  - Offer Toileting every 2 Hours, in advance of need     - Apply yellow socks and bracelet for high fall risk patients  - Consider moving patient to room near nurses station  Outcome: Progressing  Goal: Maintain or return to baseline ADL function  Description: INTERVENTIONS:  -  Assess patient's ability to carry out ADLs; assess patient's baseline for ADL function and identify physical deficits which impact ability to perform ADLs (bathing, care of mouth/teeth, toileting, grooming, dressing, etc.)  - Assess/evaluate cause of self-care deficits   - Assess range of motion  - Assess patient's mobility; develop plan if impaired  - Assess patient's need for assistive devices and provide as appropriate  - Encourage maximum independence but intervene and supervise when necessary  - Involve family in performance of ADLs  - Assess for home care needs following discharge   - Consider OT consult to assist with ADL evaluation and planning for discharge  - Provide patient education as appropriate  Outcome: Progressing  Goal: Maintains/Returns to pre admission functional level  Description: INTERVENTIONS:  - Perform AM-PAC 6 Click Basic Mobility/ Daily Activity assessment daily.  - Set and communicate  daily mobility goal to care team and patient/family/caregiver.   - Collaborate with rehabilitation services on mobility goals if consulted  - Perform Range of Motion 3 times a day.  - Reposition patient every 2 hours.  - Dangle patient 3 times a day  - Stand patient 3 times a day  - Ambulate patient 3 times a day  - Out of bed to chair 3 times a day   - Out of bed for meals 3 times a day  - Out of bed for toileting  - Record patient progress and toleration of activity level   Outcome: Progressing     Problem: DISCHARGE PLANNING  Goal: Discharge to home or other facility with appropriate resources  Description: INTERVENTIONS:  - Identify barriers to discharge w/patient and caregiver  - Arrange for needed discharge resources and transportation as appropriate  - Identify discharge learning needs (meds, wound care, etc.)  - Arrange for interpretive services to assist at discharge as needed  - Refer to Case Management Department for coordinating discharge planning if the patient needs post-hospital services based on physician/advanced practitioner order or complex needs related to functional status, cognitive ability, or social support system  Outcome: Progressing     Problem: POSTPARTUM  Goal: Experiences normal postpartum course  Description: INTERVENTIONS:  - Monitor maternal vital signs  - Assess uterine involution and lochia  Outcome: Progressing  Goal: Appropriate maternal -  bonding  Description: INTERVENTIONS:  - Identify family support  - Assess for appropriate maternal/infant bonding   -Encourage maternal/infant bonding opportunities  - Referral to  or  as needed  Outcome: Progressing  Goal: Establishment of infant feeding pattern  Description: INTERVENTIONS:  - Assess breast/bottle feeding  - Refer to lactation as needed  Outcome: Progressing  Goal: Incision(s), wounds(s) or drain site(s) healing without S/S of infection  Description: INTERVENTIONS  - Assess and document  dressing, incision, wound bed, drain sites and surrounding tissue  - Provide patient and family education     Outcome: Progressing

## 2024-10-31 NOTE — OB LABOR/OXYTOCIN SAFETY PROGRESS
Oxytocin Safety Progress Check Note - Va Lujan 34 y.o. female MRN: [id number]    Unit/Bed#: -01 Encounter: [id number]    Dose (adolfo-units/min) Oxytocin: 4 adolfo-units/min  Contraction Frequency (minutes): 1.5-3  Contraction Intensity: Mild  Uterine Activity Characteristics: Irregular  Cervical Dilation: 6        Cervical Effacement: 90  Fetal Station: 0  Baseline Rate (FHR): 145 bpm  Fetal Heart Rate (FHT): 157 BPM  FHR Category: II, intermittent variable decelerations     Provider Notified: Yes  Provider Name: Dr Edmonds      Vital Signs:   Vitals:    10/30/24 2037   BP: (!) 81/50   Pulse: 88   Resp:    Temp:    SpO2:        Notes/comments:   SVE as above, unchanged from prior. Fht Cat II for intermittent variable and late decelerations. Maternal hypotension noted 81/50 and positional; BP has been 90s-100s/50s-60s. Continue to monitor and consider fluid bolus if persistent. Contractions q2-3 minutes. Continue pitocin titration as tolerated. Plan to repeat SVE in 2 hours or sooner if clinically indicated.     Lisa Edmonds MD 10/30/2024 8:45 PM

## 2024-10-31 NOTE — LACTATION NOTE
This note was copied from a baby's chart.     10/31/24 1600   Lactation Consultation   Reason for Consult 20 min   LATCH Documentation   Latch 2   Audible Swallowing 2   Type of Nipple 2   Comfort (Breast/Nipple) 1   Hold (Positioning) 1   LATCH Score 8   Having latch problems? No   Position(s) Used Laid Back;Cross Cradle   Breasts/Nipples   Left Breast Soft   Right Breast Soft   Left Nipple Everted   Right Nipple Everted   Intervention Hand expression   Breastfeeding Progress Not yet established;Breastfeeding well   Patient Follow-Up   Lactation Consult Status 2   Follow-Up Type Inpatient;Call as needed   Other OB Lactation Documentation    Additional Problem Noted Biological breastfeeding positions implemented. Masood latched readily. Left nipple bruised. HE effective for small drops that were reassuring for Va. encouraged her to pump if needed, but currently, baby latching well.     Encouraged parents to call for assistance, questions, and concerns about breastfeeding.

## 2024-10-31 NOTE — ANESTHESIA POSTPROCEDURE EVALUATION
Post-Op Assessment Note    CV Status:  Stable  Pain Score: 0    Pain management: adequate      Post-op block assessment: catheter intact and site cleaned   Mental Status:  Alert   Hydration Status:  Stable   PONV Controlled:  None   Airway Patency:  Patent     Post Op Vitals Reviewed: Yes    No anethesia notable event occurred.    Staff: Anesthesiologist             BP: 106/78  HR: 82  TEMP: 98.1F  RR: 14  SpO2 98%      Modified [place]:  No data recorded    Epidural removed with tip intact

## 2024-10-31 NOTE — PLAN OF CARE
Problem: Knowledge Deficit  Goal: Verbalizes understanding of labor plan  Description: Assess patient/family/caregiver's baseline knowledge level and ability to understand information.  Provide education via patient/family/caregiver's preferred learning method at appropriate level of understanding.     1. Provide teaching at level of understanding.  2. Provide teaching via preferred learning method(s).  10/31/2024 0333 by Phuong Dowling RN  Outcome: Progressing    Goal: Patient/family/caregiver demonstrates understanding of disease process, treatment plan, medications, and discharge instructions  Description: Complete learning assessment and assess knowledge base.  Interventions:  - Provide teaching at level of understanding  - Provide teaching via preferred learning methods  10/31/2024 0333 by Phuong Dowling RN  Outcome: Progressing    Problem: PAIN - ADULT  Goal: Verbalizes/displays adequate comfort level or baseline comfort level  Description: Interventions:  - Encourage patient to monitor pain and request assistance  - Assess pain using appropriate pain scale  - Administer analgesics based on type and severity of pain and evaluate response  - Implement non-pharmacological measures as appropriate and evaluate response  - Consider cultural and social influences on pain and pain management  - Notify physician/advanced practitioner if interventions unsuccessful or patient reports new pain  10/31/2024 0333 by Phuong Dowling RN  Outcome: Progressing       Problem: INFECTION - ADULT  Goal: Absence or prevention of progression during hospitalization  Description: INTERVENTIONS:  - Assess and monitor for signs and symptoms of infection  - Monitor lab/diagnostic results  - Monitor all insertion sites, i.e. indwelling lines, tubes, and drains  - Monitor endotracheal if appropriate and nasal secretions for changes in amount and color  - [place] appropriate cooling/warming therapies per order  - Administer medications as  ordered  - Instruct and encourage patient and family to use good hand hygiene technique  - Identify and instruct in appropriate isolation precautions for identified infection/condition  10/31/2024 0333 by Phuong Dowling RN  Outcome: Progressing    Goal: Absence of fever/infection during neutropenic period  Description: INTERVENTIONS:  - Monitor WBC    10/31/2024 0333 by Phuong Dowling RN  Outcome: Progressing    Problem: SAFETY ADULT  Goal: Patient will remain free of falls  Description: INTERVENTIONS:  - Educate patient/family on patient safety including physical limitations  - Instruct patient to call for assistance with activity   - Consult OT/PT to assist with strengthening/mobility   - Keep Call bell within reach  - Keep bed low and locked with side rails adjusted as appropriate  - Keep care items and personal belongings within reach  - Initiate and maintain comfort rounds  - Make Fall Risk Sign visible to staff  - Offer Toileting every  Hours, in advance of need  - Initiate/Maintain alarm  - Obtain necessary fall risk management equipment:   - Apply yellow socks and bracelet for high fall risk patients  - Consider moving patient to room near nurses station  10/31/2024 0333 by Phuong Dowling RN  Outcome: Progressing    Goal: Maintain or return to baseline ADL function  Description: INTERVENTIONS:  -  Assess patient's ability to carry out ADLs; assess patient's baseline for ADL function and identify physical deficits which impact ability to perform ADLs (bathing, care of mouth/teeth, toileting, grooming, dressing, etc.)  - Assess/evaluate cause of self-care deficits   - Assess range of motion  - Assess patient's mobility; develop plan if impaired  - Assess patient's need for assistive devices and provide as appropriate  - Encourage maximum independence but intervene and supervise when necessary  - Involve family in performance of ADLs  - Assess for home care needs following discharge   - Consider OT consult to  assist with ADL evaluation and planning for discharge  - Provide patient education as appropriate  10/31/2024 0333 by Phuong Dowling RN  Outcome: Progressing    Goal: Maintains/Returns to pre admission functional level  Description: INTERVENTIONS:  - Perform AM-PAC 6 Click Basic Mobility/ Daily Activity assessment daily.  - Set and communicate daily mobility goal to care team and patient/family/caregiver.   - Collaborate with rehabilitation services on mobility goals if consulted  - Perform Range of Motion  times a day.  - Reposition patient every  hours.  - Dangle patient  times a day  - Stand patient  times a day  - Ambulate patient  times a day  - Out of bed to chair  times a day   - Out of bed for meals  times a day  - Out of bed for toileting  - Record patient progress and toleration of activity level   10/31/2024 0333 by Phuong Dowling RN  Outcome: Progressing     Problem: DISCHARGE PLANNING  Goal: Discharge to home or other facility with appropriate resources  Description: INTERVENTIONS:  - Identify barriers to discharge w/patient and caregiver  - Arrange for needed discharge resources and transportation as appropriate  - Identify discharge learning needs (meds, wound care, etc.)  - Arrange for interpretive services to assist at discharge as needed  - Refer to Case Management Department for coordinating discharge planning if the patient needs post-hospital services based on physician/advanced practitioner order or complex needs related to functional status, cognitive ability, or social support system  10/31/2024 0333 by Phuong Dowling RN  Outcome: Progressing    Problem: POSTPARTUM  Goal: Experiences normal postpartum course  Description: INTERVENTIONS:  - Monitor maternal vital signs  - Assess uterine involution and lochia  Outcome: Progressing  Goal: Appropriate maternal -  bonding  Description: INTERVENTIONS:  - Identify family support  - Assess for appropriate maternal/infant bonding   -Encourage  maternal/infant bonding opportunities  - Referral to  or  as needed  Outcome: Progressing  Goal: Establishment of infant feeding pattern  Description: INTERVENTIONS:  - Assess breast/bottle feeding  - Refer to lactation as needed  Outcome: Progressing  Goal: Incision(s), wounds(s) or drain site(s) healing without S/S of infection  Description: INTERVENTIONS  - Assess and document dressing, incision, wound bed, drain sites and surrounding tissue  - Provide patient and family education  - Perform skin care/dressing changes every   Outcome: Progressing     Problem: Labor & Delivery  Goal: Manages discomfort  Description: Assess and monitor for signs and symptoms of discomfort.  Assess patient's pain level regularly and per hospital policy.  Administer medications as ordered. Support use of nonpharmacological methods to help control pain such as distraction, imagery, relaxation, and application of heat and cold.  Collaborate with interdisciplinary team and patient to determine appropriate pain management plan.    1. Include patient in decisions related to comfort.  2. Offer non-pharmacological pain management interventions.  3. Report ineffective pain management to physician.  10/31/2024 0333 by Phuong Dowling RN  Outcome: Completed    Goal: Patient vital signs are stable  Description: 1. Assess vital signs - vaginal delivery.  10/31/2024 0333 by Phuong Dowling RN  Outcome: Completed    Problem: BIRTH - VAGINAL/ SECTION  Goal: Fetal and maternal status remain reassuring during the birth process  Description: INTERVENTIONS:  - Monitor vital signs  - Monitor fetal heart rate  - Monitor uterine activity  - Monitor labor progression (vaginal delivery)  - DVT prophylaxis  - Antibiotic prophylaxis  10/31/2024 0333 by Phuong Dowling RN  Outcome: Completed    Goal: Emotionally satisfying birthing experience for mother/fetus  Description: Interventions:  - Assess, plan, implement and evaluate the  nursing care given to the patient in labor  - Advocate the philosophy that each childbirth experience is a unique experience and support the family's chosen level of involvement and control during the labor process   - Actively participate in both the patient's and family's teaching of the birth process  - Consider cultural, Mu-ism and age-specific factors and plan care for the patient in labor  10/31/2024 0333 by Phuong Dowling, RN  Outcome: Completed

## 2024-11-01 PROBLEM — N17.9 AKI (ACUTE KIDNEY INJURY) (HCC): Status: ACTIVE | Noted: 2024-11-01

## 2024-11-01 PROCEDURE — 99024 POSTOP FOLLOW-UP VISIT: CPT | Performed by: OBSTETRICS & GYNECOLOGY

## 2024-11-01 RX ADMIN — Medication 1 TABLET: at 10:21

## 2024-11-01 RX ADMIN — ACETAMINOPHEN 650 MG: 325 TABLET, FILM COATED ORAL at 18:05

## 2024-11-01 RX ADMIN — DOCUSATE SODIUM 100 MG: 100 CAPSULE, LIQUID FILLED ORAL at 10:21

## 2024-11-01 RX ADMIN — DOCUSATE SODIUM 100 MG: 100 CAPSULE, LIQUID FILLED ORAL at 18:05

## 2024-11-01 RX ADMIN — ACETAMINOPHEN 650 MG: 325 TABLET, FILM COATED ORAL at 06:30

## 2024-11-01 RX ADMIN — ACETAMINOPHEN 650 MG: 325 TABLET, FILM COATED ORAL at 12:30

## 2024-11-01 RX ADMIN — OXYCODONE HYDROCHLORIDE 5 MG: 5 TABLET ORAL at 10:21

## 2024-11-01 NOTE — PLAN OF CARE
Problem: Knowledge Deficit  Goal: Verbalizes understanding of labor plan  Description: Assess patient/family/caregiver's baseline knowledge level and ability to understand information.  Provide education via patient/family/caregiver's preferred learning method at appropriate level of understanding.     1. Provide teaching at level of understanding.  2. Provide teaching via preferred learning method(s).  Outcome: Progressing  Goal: Patient/family/caregiver demonstrates understanding of disease process, treatment plan, medications, and discharge instructions  Description: Complete learning assessment and assess knowledge base.  Interventions:  - Provide teaching at level of understanding  - Provide teaching via preferred learning methods  Outcome: Progressing     Problem: PAIN - ADULT  Goal: Verbalizes/displays adequate comfort level or baseline comfort level  Description: Interventions:  - Encourage patient to monitor pain and request assistance  - Assess pain using appropriate pain scale  - Administer analgesics based on type and severity of pain and evaluate response  - Implement non-pharmacological measures as appropriate and evaluate response  - Consider cultural and social influences on pain and pain management  - Notify physician/advanced practitioner if interventions unsuccessful or patient reports new pain  Outcome: Progressing     Problem: INFECTION - ADULT  Goal: Absence or prevention of progression during hospitalization  Description: INTERVENTIONS:  - Assess and monitor for signs and symptoms of infection  - Monitor lab/diagnostic results  - Monitor all insertion sites, i.e. indwelling lines, tubes, and drains  - Monitor endotracheal if appropriate and nasal secretions for changes in amount and color  - [place] appropriate cooling/warming therapies per order  - Administer medications as ordered  - Instruct and encourage patient and family to use good hand hygiene technique  - Identify and instruct in  appropriate isolation precautions for identified infection/condition  Outcome: Progressing  Goal: Absence of fever/infection during neutropenic period  Description: INTERVENTIONS:  - Monitor WBC    Outcome: Progressing     Problem: SAFETY ADULT  Goal: Patient will remain free of falls  Description: INTERVENTIONS:  - Educate patient/family on patient safety including physical limitations  - Instruct patient to call for assistance with activity   - Consult OT/PT to assist with strengthening/mobility   - Keep Call bell within reach  - Keep bed low and locked with side rails adjusted as appropriate  - Keep care items and personal belongings within reach  - Initiate and maintain comfort rounds  - Make Fall Risk Sign visible to staff  - Offer Toileting every  Hours, in advance of need  - Initiate/Maintain alarm  - Obtain necessary fall risk management equipment:   - Apply yellow socks and bracelet for high fall risk patients  - Consider moving patient to room near nurses station  Outcome: Progressing  Goal: Maintain or return to baseline ADL function  Description: INTERVENTIONS:  -  Assess patient's ability to carry out ADLs; assess patient's baseline for ADL function and identify physical deficits which impact ability to perform ADLs (bathing, care of mouth/teeth, toileting, grooming, dressing, etc.)  - Assess/evaluate cause of self-care deficits   - Assess range of motion  - Assess patient's mobility; develop plan if impaired  - Assess patient's need for assistive devices and provide as appropriate  - Encourage maximum independence but intervene and supervise when necessary  - Involve family in performance of ADLs  - Assess for home care needs following discharge   - Consider OT consult to assist with ADL evaluation and planning for discharge  - Provide patient education as appropriate  Outcome: Progressing  Goal: Maintains/Returns to pre admission functional level  Description: INTERVENTIONS:  - Perform AM-PAC 6 Click  Basic Mobility/ Daily Activity assessment daily.  - Set and communicate daily mobility goal to care team and patient/family/caregiver.   - Collaborate with rehabilitation services on mobility goals if consulted  - Perform Range of Motion  times a day.  - Reposition patient every  hours.  - Dangle patient  times a day  - Stand patient  times a day  - Ambulate patient  times a day  - Out of bed to chair  times a day   - Out of bed for meals  times a day  - Out of bed for toileting  - Record patient progress and toleration of activity level   Outcome: Progressing     Problem: DISCHARGE PLANNING  Goal: Discharge to home or other facility with appropriate resources  Description: INTERVENTIONS:  - Identify barriers to discharge w/patient and caregiver  - Arrange for needed discharge resources and transportation as appropriate  - Identify discharge learning needs (meds, wound care, etc.)  - Arrange for interpretive services to assist at discharge as needed  - Refer to Case Management Department for coordinating discharge planning if the patient needs post-hospital services based on physician/advanced practitioner order or complex needs related to functional status, cognitive ability, or social support system  Outcome: Progressing     Problem: POSTPARTUM  Goal: Experiences normal postpartum course  Description: INTERVENTIONS:  - Monitor maternal vital signs  - Assess uterine involution and lochia  Outcome: Progressing  Goal: Appropriate maternal -  bonding  Description: INTERVENTIONS:  - Identify family support  - Assess for appropriate maternal/infant bonding   -Encourage maternal/infant bonding opportunities  - Referral to  or  as needed  Outcome: Progressing  Goal: Establishment of infant feeding pattern  Description: INTERVENTIONS:  - Assess breast/bottle feeding  - Refer to lactation as needed  Outcome: Progressing  Goal: Incision(s), wounds(s) or drain site(s) healing without S/S of  infection  Description: INTERVENTIONS  - Assess and document dressing, incision, wound bed, drain sites and surrounding tissue  - Provide patient and family education  - Perform skin care/dressing changes every   Outcome: Progressing

## 2024-11-01 NOTE — PROGRESS NOTES
Obstetrics Progress Note  Va Lujan 34 y.o. female MRN: [id number]  Unit/Bed#: -01 Encounter: [id number]    Assessment/Plan:  Postoperative day #1 s/p primary low transverse  delivery. Stable.  Baby in room. By issue:  Assessment & Plan  S/P primary low transverse   Routine postpartum care  Encourage ambulation  Encourage familial bonding  Lactation support as needed  Pain: Tylenol around the clock, oxycodone if needed  Pre-delivery Hgb 13.1 --> Post Delivery 11.5  Voiding spontaneously  DVT Ppx: ambulation, SCDs      SEPIDEH (acute kidney injury) ([facility])  Cr 0.8 > 1.4 > 0.92  Avoid NSAIDs      Anticipate discharge POD #2 vs #3.      Subjective/Objective   Chief Complaint:   Post delivery.     Subjective:   Pain: well controlled. Tolerating PO: yes. Voiding: yes. Flatus: yes. Bowel Movement: no. Ambulating: yes. Chest pain: no. Shortness of breath: no. Leg pain: no. Lochia: within normal limits. Infant feeding: breast/bottle.    Objective:   Vitals:   Temp:  [98.7 °F (37.1 °C)-99.3 °F (37.4 °C)] 98.7 °F (37.1 °C)  HR:  [] 86  BP: ()/(53-84) 91/53  Resp:  [16-18] 18  SpO2:  [96 %-98 %] 97 %  O2 Device: None (Room air)     Intake/Output Summary (Last 24 hours) at 2024 0616  Last data filed at 10/31/2024 2018  Gross per 24 hour   Intake --   Output 2075 ml   Net -2075 ml       Physical Exam:   -General: alert and oriented x 3, in no apparent distress  -Cardiovascular: regular rate and rhythm  -Pulmonary: normal effort, clear to auscultation bilaterally  -Abdomen/Pelvis: soft, non-tender, non-distended, no rebound or guarding. Uterine fundus firm and non-tender, at the umbilicus. Incision: C/D/I  -Extremities: Nontender    Lab Results   Component Value Date    WBC 20.80 (H) 10/31/2024    HGB 11.5 10/31/2024    HCT 33.3 (L) 10/31/2024    MCV 92 10/31/2024     (L) 10/31/2024         Mindi Anne MD  PGY-2, OBGYN  2024, 6:16 AM

## 2024-11-01 NOTE — ASSESSMENT & PLAN NOTE
Routine postpartum care  Encourage ambulation  Encourage familial bonding  Lactation support as needed  Pain: Tylenol around the clock, oxycodone if needed  Pre-delivery Hgb 13.1 --> Post Delivery 11.5  Voiding spontaneously  DVT Ppx: ambulation, SCDs

## 2024-11-01 NOTE — PLAN OF CARE
Problem: Knowledge Deficit  Goal: Verbalizes understanding of labor plan  Description: Assess patient/family/caregiver's baseline knowledge level and ability to understand information.  Provide education via patient/family/caregiver's preferred learning method at appropriate level of understanding.     1. Provide teaching at level of understanding.  2. Provide teaching via preferred learning method(s).  Outcome: Progressing  Goal: Patient/family/caregiver demonstrates understanding of disease process, treatment plan, medications, and discharge instructions  Description: Complete learning assessment and assess knowledge base.  Interventions:  - Provide teaching at level of understanding  - Provide teaching via preferred learning methods  Outcome: Progressing     Problem: PAIN - ADULT  Goal: Verbalizes/displays adequate comfort level or baseline comfort level  Description: Interventions:  - Encourage patient to monitor pain and request assistance  - Assess pain using appropriate pain scale  - Administer analgesics based on type and severity of pain and evaluate response  - Implement non-pharmacological measures as appropriate and evaluate response  - Consider cultural and social influences on pain and pain management  - Notify physician/advanced practitioner if interventions unsuccessful or patient reports new pain  Outcome: Progressing     Problem: INFECTION - ADULT  Goal: Absence or prevention of progression during hospitalization  Description: INTERVENTIONS:  - Assess and monitor for signs and symptoms of infection  - Monitor lab/diagnostic results  - Monitor all insertion sites, i.e. indwelling lines, tubes, and drains  - Monitor endotracheal if appropriate and nasal secretions for changes in amount and color  - [place] appropriate cooling/warming therapies per order  - Administer medications as ordered  - Instruct and encourage patient and family to use good hand hygiene technique  - Identify and instruct in  appropriate isolation precautions for identified infection/condition  Outcome: Progressing  Goal: Absence of fever/infection during neutropenic period  Description: INTERVENTIONS:  - Monitor WBC    Outcome: Progressing     Problem: SAFETY ADULT  Goal: Patient will remain free of falls  Description: INTERVENTIONS:  - Educate patient/family on patient safety including physical limitations  - Instruct patient to call for assistance with activity   - Consult OT/PT to assist with strengthening/mobility   - Keep Call bell within reach  - Keep bed low and locked with side rails adjusted as appropriate  - Keep care items and personal belongings within reach  - Initiate and maintain comfort rounds  - Apply yellow socks and bracelet for high fall risk patients  - Consider moving patient to room near nurses station  Outcome: Progressing  Goal: Maintain or return to baseline ADL function  Description: INTERVENTIONS:  -  Assess patient's ability to carry out ADLs; assess patient's baseline for ADL function and identify physical deficits which impact ability to perform ADLs (bathing, care of mouth/teeth, toileting, grooming, dressing, etc.)  - Assess/evaluate cause of self-care deficits   - Assess range of motion  - Assess patient's mobility; develop plan if impaired  - Assess patient's need for assistive devices and provide as appropriate  - Encourage maximum independence but intervene and supervise when necessary  - Involve family in performance of ADLs  - Assess for home care needs following discharge   - Consider OT consult to assist with ADL evaluation and planning for discharge  - Provide patient education as appropriate  Outcome: Progressing  Goal: Maintains/Returns to pre admission functional level  Description: INTERVENTIONS:  - Perform AM-PAC 6 Click Basic Mobility/ Daily Activity assessment daily.  - Set and communicate daily mobility goal to care team and patient/family/caregiver.   - Collaborate with rehabilitation  services on mobility goals if consulted  - Out of bed for toileting  - Record patient progress and toleration of activity level   Outcome: Progressing     Problem: DISCHARGE PLANNING  Goal: Discharge to home or other facility with appropriate resources  Description: INTERVENTIONS:  - Identify barriers to discharge w/patient and caregiver  - Arrange for needed discharge resources and transportation as appropriate  - Identify discharge learning needs (meds, wound care, etc.)  - Arrange for interpretive services to assist at discharge as needed  - Refer to Case Management Department for coordinating discharge planning if the patient needs post-hospital services based on physician/advanced practitioner order or complex needs related to functional status, cognitive ability, or social support system  Outcome: Progressing     Problem: POSTPARTUM  Goal: Experiences normal postpartum course  Description: INTERVENTIONS:  - Monitor maternal vital signs  - Assess uterine involution and lochia  Outcome: Progressing  Goal: Appropriate maternal -  bonding  Description: INTERVENTIONS:  - Identify family support  - Assess for appropriate maternal/infant bonding   -Encourage maternal/infant bonding opportunities  - Referral to  or  as needed  Outcome: Progressing  Goal: Establishment of infant feeding pattern  Description: INTERVENTIONS:  - Assess breast/bottle feeding  - Refer to lactation as needed  Outcome: Progressing  Goal: Incision(s), wounds(s) or drain site(s) healing without S/S of infection  Description: INTERVENTIONS  - Assess and document dressing, incision, wound bed, drain sites and surrounding tissue  - Provide patient and family education  - Perform skin care/dressing changes every   Outcome: Progressing

## 2024-11-02 VITALS
HEIGHT: 60 IN | HEART RATE: 98 BPM | SYSTOLIC BLOOD PRESSURE: 100 MMHG | WEIGHT: 118 LBS | BODY MASS INDEX: 23.16 KG/M2 | RESPIRATION RATE: 20 BRPM | TEMPERATURE: 98.1 F | OXYGEN SATURATION: 98 % | DIASTOLIC BLOOD PRESSURE: 57 MMHG

## 2024-11-02 PROBLEM — Z98.891 S/P PRIMARY LOW TRANSVERSE C-SECTION: Chronic | Status: ACTIVE | Noted: 2024-10-31

## 2024-11-02 PROBLEM — Z3A.41 41 WEEKS GESTATION OF PREGNANCY: Status: RESOLVED | Noted: 2024-04-26 | Resolved: 2024-11-02

## 2024-11-02 PROBLEM — O48.0 41 WEEKS GESTATION OF PREGNANCY: Status: RESOLVED | Noted: 2024-04-26 | Resolved: 2024-11-02

## 2024-11-02 PROCEDURE — 99024 POSTOP FOLLOW-UP VISIT: CPT | Performed by: STUDENT IN AN ORGANIZED HEALTH CARE EDUCATION/TRAINING PROGRAM

## 2024-11-02 RX ORDER — ACETAMINOPHEN 325 MG/1
650 TABLET ORAL EVERY 6 HOURS SCHEDULED
Qty: 12 TABLET | Refills: 0 | Status: SHIPPED | OUTPATIENT
Start: 2024-11-02 | End: 2024-11-04

## 2024-11-02 RX ORDER — OXYCODONE HYDROCHLORIDE 5 MG/1
5 TABLET ORAL EVERY 4 HOURS PRN
Qty: 3 TABLET | Refills: 0 | Status: SHIPPED | OUTPATIENT
Start: 2024-11-02 | End: 2024-11-11

## 2024-11-02 RX ADMIN — ACETAMINOPHEN 650 MG: 325 TABLET, FILM COATED ORAL at 05:36

## 2024-11-02 RX ADMIN — ACETAMINOPHEN 650 MG: 325 TABLET, FILM COATED ORAL at 11:37

## 2024-11-02 RX ADMIN — DOCUSATE SODIUM 100 MG: 100 CAPSULE, LIQUID FILLED ORAL at 08:48

## 2024-11-02 RX ADMIN — Medication 1 TABLET: at 08:48

## 2024-11-02 NOTE — PLAN OF CARE
Problem: Knowledge Deficit  Goal: Verbalizes understanding of labor plan  Description: Assess patient/family/caregiver's baseline knowledge level and ability to understand information.  Provide education via patient/family/caregiver's preferred learning method at appropriate level of understanding.     1. Provide teaching at level of understanding.  2. Provide teaching via preferred learning method(s).  Outcome: Progressing  Goal: Patient/family/caregiver demonstrates understanding of disease process, treatment plan, medications, and discharge instructions  Description: Complete learning assessment and assess knowledge base.  Interventions:  - Provide teaching at level of understanding  - Provide teaching via preferred learning methods  Outcome: Progressing     Problem: PAIN - ADULT  Goal: Verbalizes/displays adequate comfort level or baseline comfort level  Description: Interventions:  - Encourage patient to monitor pain and request assistance  - Assess pain using appropriate pain scale  - Administer analgesics based on type and severity of pain and evaluate response  - Implement non-pharmacological measures as appropriate and evaluate response  - Consider cultural and social influences on pain and pain management  - Notify physician/advanced practitioner if interventions unsuccessful or patient reports new pain  Outcome: Progressing     Problem: INFECTION - ADULT  Goal: Absence or prevention of progression during hospitalization  Description: INTERVENTIONS:  - Assess and monitor for signs and symptoms of infection  - Monitor lab/diagnostic results  - Monitor all insertion sites, i.e. indwelling lines, tubes, and drains  - Monitor endotracheal if appropriate and nasal secretions for changes in amount and color  - [place] appropriate cooling/warming therapies per order  - Administer medications as ordered  - Instruct and encourage patient and family to use good hand hygiene technique  - Identify and instruct in  appropriate isolation precautions for identified infection/condition  Outcome: Progressing  Goal: Absence of fever/infection during neutropenic period  Description: INTERVENTIONS:  - Monitor WBC    Outcome: Progressing     Problem: SAFETY ADULT  Goal: Patient will remain free of falls  Description: INTERVENTIONS:  - Educate patient/family on patient safety including physical limitations  - Instruct patient to call for assistance with activity   - Consult OT/PT to assist with strengthening/mobility   - Keep Call bell within reach  - Keep bed low and locked with side rails adjusted as appropriate  - Keep care items and personal belongings within reach  - Apply yellow socks and bracelet for high fall risk patients  - Consider moving patient to room near nurses station  Outcome: Progressing  Goal: Maintain or return to baseline ADL function  Description: INTERVENTIONS:  -  Assess patient's ability to carry out ADLs; assess patient's baseline for ADL function and identify physical deficits which impact ability to perform ADLs (bathing, care of mouth/teeth, toileting, grooming, dressing, etc.)  - Assess/evaluate cause of self-care deficits   - Assess range of motion  - Assess patient's mobility; develop plan if impaired  - Assess patient's need for assistive devices and provide as appropriate  - Encourage maximum independence but intervene and supervise when necessary  - Involve family in performance of ADLs  - Assess for home care needs following discharge   - Consider OT consult to assist with ADL evaluation and planning for discharge  - Provide patient education as appropriate  Outcome: Progressing  Goal: Maintains/Returns to pre admission functional level  Description: INTERVENTIONS:  - Perform AM-PAC 6 Click Basic Mobility/ Daily Activity assessment daily.  - Set and communicate daily mobility goal to care team and patient/family/caregiver.   - Collaborate with rehabilitation services on mobility goals if  consulted  - Out of bed for toileting  - Record patient progress and toleration of activity level   Outcome: Progressing     Problem: DISCHARGE PLANNING  Goal: Discharge to home or other facility with appropriate resources  Description: INTERVENTIONS:  - Identify barriers to discharge w/patient and caregiver  - Arrange for needed discharge resources and transportation as appropriate  - Identify discharge learning needs (meds, wound care, etc.)  - Arrange for interpretive services to assist at discharge as needed  - Refer to Case Management Department for coordinating discharge planning if the patient needs post-hospital services based on physician/advanced practitioner order or complex needs related to functional status, cognitive ability, or social support system  Outcome: Progressing     Problem: POSTPARTUM  Goal: Experiences normal postpartum course  Description: INTERVENTIONS:  - Monitor maternal vital signs  - Assess uterine involution and lochia  Outcome: Progressing  Goal: Appropriate maternal -  bonding  Description: INTERVENTIONS:  - Identify family support  - Assess for appropriate maternal/infant bonding   -Encourage maternal/infant bonding opportunities  - Referral to  or  as needed  Outcome: Progressing  Goal: Establishment of infant feeding pattern  Description: INTERVENTIONS:  - Assess breast/bottle feeding  - Refer to lactation as needed  Outcome: Progressing  Goal: Incision(s), wounds(s) or drain site(s) healing without S/S of infection  Description: INTERVENTIONS  - Assess and document dressing, incision, wound bed, drain sites and surrounding tissue  - Provide patient and family education  - Perform skin care/dressing changes every   Outcome: Progressing

## 2024-11-02 NOTE — DISCHARGE INSTR - AVS FIRST PAGE
Discharge instructions:   -Do not place anything (no partner, sex toys, tampons, douche, etc.) in your vagina for 6 weeks  -You may walk for exercise for the first 6 weeks then gradually return to your usual activities   -Please do not drive for 1 week if you have no stitches and for 2 weeks if you have stitches    -Please do not drive if you are taking any narcotic medications  -You may take baths or shower per your preference   -Please examine your breasts in the mirror daily and call your doctor for redness, tenderness, increased warmth, fevers, or chills  -Please call your doctor's office for temperature > 100.4*F or 38*C, worsening pain, increased bleeding (filling 2 or more maxi pads within 1 hr or less for at least 2 hrs), foul-smelling discharge/drainage, burning with urination, or symptoms of depression   detailed exam

## 2024-11-02 NOTE — PROGRESS NOTES
Progress Note - OB/GYN  Va Lujan 34 y.o. female MRN: [id number]  Unit/Bed#:  322-01 Encounter: [id number]    Assessment and Plan   Va Lujan is a patient of: [facility]. She is POD# 2 s/p primary  section, low transverse incision. Recovering well and stable.    SEPIDEH (acute kidney injury) (HCC)  Assessment & Plan  Cr 0.8 > 1.4 > 0.92 >   - avoid NSAIDs    * S/P primary low transverse   Assessment & Plan  QBL: 665cc; admission Hb 13.1g/dL -> 11.5g/dL. Patient progressing toward postoperative milestones.  - Continue routine postoperative care  - Pain management with oral analgesics  - DVT Ppx: SCDs; encourage ambulation  - Void trial passed  - Encourage breastfeeding, familial bonding          Disposition   - Anticipate discharge home on POD# 2    Subjective/Objective   Chief Concern: POD#2 s/p primary  section, low transverse incision  Subjective:    Va Lujan has no current concerns. Pain is well controlled. She is currently voiding. She is currently passing flatus. She is ambulating. She is tolerating PO and denies nausea or vomitting. She denies chest pain, shortness of breath, lightheadedness. Lochia is normal. She is breastfeeding.    Vitals:   /57   Pulse 98   Temp 98.1 °F (36.7 °C) (Oral)   Resp 20   Ht 5' (1.524 m)   Wt 53.5 kg (118 lb)   LMP 2024 (Exact Date)   SpO2 98%   Breastfeeding Yes   BMI 23.05 kg/m²     No intake or output data in the 24 hours ending 24 0946    Invasive Devices       None                 Physical Exam:   GEN: well-appearing, alert and oriented x3  CARDIO: regular rate  RESP: nonlabored respirations on room air  ABDOMEN: soft, nontender, nodistended, fundus firm below the umbilicus, incision C/D/I  LOWER EXTREMITIES: nontender, no erythema, trace edema bilaterally    Labs:   Hemoglobin   Date Value Ref Range Status   10/31/2024 11.5 11.5 - 15.4 g/dL Final   10/29/2024 13.1 11.5 - 15.4 g/dL  Final     WBC   Date Value Ref Range Status   10/31/2024 20.80 (H) 4.31 - 10.16 Thousand/uL Final   10/29/2024 8.62 4.31 - 10.16 Thousand/uL Final     Platelets   Date Value Ref Range Status   10/31/2024 142 (L) 149 - 390 Thousands/uL Final   10/29/2024 192 149 - 390 Thousands/uL Final     Creatinine   Date Value Ref Range Status   10/31/2024 0.92 0.60 - 1.30 mg/dL Final     Comment:     Standardized to IDMS reference method   10/31/2024 1.41 (H) 0.60 - 1.30 mg/dL Final     Comment:     Standardized to IDMS reference method     AST   Date Value Ref Range Status   10/31/2024 21 13 - 39 U/L Final   10/31/2024 18 13 - 39 U/L Final     ALT   Date Value Ref Range Status   10/31/2024 7 7 - 52 U/L Final     Comment:     Specimen collection should occur prior to Sulfasalazine administration due to the potential for falsely depressed results.    10/31/2024 6 (L) 7 - 52 U/L Final     Comment:     Specimen collection should occur prior to Sulfasalazine administration due to the potential for falsely depressed results.

## 2024-11-02 NOTE — LACTATION NOTE
This note was copied from a baby's chart.  Discharge Lactation    Met with Va who is breastfeeding her baby boy and also bottle feeding Donor Breast Milk and Formula. Va states baby still appears hungry after breastfeeding and often falls asleep at the breast.    Reviewed latch and positioning basics. Assisted with latching baby deeply in a laid back cross cradle hold. Discussed what a deep latch looks and feels like. Reviewed cluster feeding and encouraged letting baby suckle at the breast as much as he wants. Discussed offering 2-4 breasts every feeding.    Discussed colostrum never running out and that her breasts never fully empty. Discussed the need to pump when a bottle is given regardless of visible milk output. Education on paced bottle feeding given. Education on electric pumping, manual pumping, and hand expression provided. Settings reviewed.    Discussed nipple care including use of lanolin and use and precautions of Silverettes.    Reviewed the Discharge Booklet briefly. Encouraged utilizing the [facility] for breastfeeding support groups and private lactation consultations as needed. Va has no further questions at this time.

## 2024-11-02 NOTE — PLAN OF CARE
Problem: Knowledge Deficit  Goal: Verbalizes understanding of labor plan  Description: Assess patient/family/caregiver's baseline knowledge level and ability to understand information.  Provide education via patient/family/caregiver's preferred learning method at appropriate level of understanding.     1. Provide teaching at level of understanding.  2. Provide teaching via preferred learning method(s).  Outcome: Progressing  Goal: Patient/family/caregiver demonstrates understanding of disease process, treatment plan, medications, and discharge instructions  Description: Complete learning assessment and assess knowledge base.  Interventions:  - Provide teaching at level of understanding  - Provide teaching via preferred learning methods  Outcome: Progressing     Problem: PAIN - ADULT  Goal: Verbalizes/displays adequate comfort level or baseline comfort level  Description: Interventions:  - Encourage patient to monitor pain and request assistance  - Assess pain using appropriate pain scale  - Administer analgesics based on type and severity of pain and evaluate response  - Implement non-pharmacological measures as appropriate and evaluate response  - Consider cultural and social influences on pain and pain management  - Notify physician/advanced practitioner if interventions unsuccessful or patient reports new pain  Outcome: Progressing     Problem: INFECTION - ADULT  Goal: Absence or prevention of progression during hospitalization  Description: INTERVENTIONS:  - Assess and monitor for signs and symptoms of infection  - Monitor lab/diagnostic results  - Monitor all insertion sites, i.e. indwelling lines, tubes, and drains  - Monitor endotracheal if appropriate and nasal secretions for changes in amount and color  - [place]vlin appropriate cooling/warming therapies per order  - Administer medications as ordered  - Instruct and encourage patient and family to use good hand hygiene technique  - Identify and instruct in  appropriate isolation precautions for identified infection/condition  Outcome: Progressing  Goal: Absence of fever/infection during neutropenic period  Description: INTERVENTIONS:  - Monitor WBC    Outcome: Progressing     Problem: SAFETY ADULT  Goal: Patient will remain free of falls  Description: INTERVENTIONS:  - Educate patient/family on patient safety including physical limitations  - Instruct patient to call for assistance with activity   - Consult OT/PT to assist with strengthening/mobility   - Keep Call bell within reach  - Keep bed low and locked with side rails adjusted as appropriate  - Keep care items and personal belongings within reach  - Initiate and maintain comfort rounds  - Make Fall Risk Sign visible to staff  - Offer Toileting every  Hours, in advance of need  - Initiate/Maintain alarm  - Obtain necessary fall risk management equipment:   - Apply yellow socks and bracelet for high fall risk patients  - Consider moving patient to room near nurses station  Outcome: Progressing  Goal: Maintain or return to baseline ADL function  Description: INTERVENTIONS:  -  Assess patient's ability to carry out ADLs; assess patient's baseline for ADL function and identify physical deficits which impact ability to perform ADLs (bathing, care of mouth/teeth, toileting, grooming, dressing, etc.)  - Assess/evaluate cause of self-care deficits   - Assess range of motion  - Assess patient's mobility; develop plan if impaired  - Assess patient's need for assistive devices and provide as appropriate  - Encourage maximum independence but intervene and supervise when necessary  - Involve family in performance of ADLs  - Assess for home care needs following discharge   - Consider OT consult to assist with ADL evaluation and planning for discharge  - Provide patient education as appropriate  Outcome: Progressing  Goal: Maintains/Returns to pre admission functional level  Description: INTERVENTIONS:  - Perform AM-PAC 6 Click  Basic Mobility/ Daily Activity assessment daily.  - Set and communicate daily mobility goal to care team and patient/family/caregiver.   - Collaborate with rehabilitation services on mobility goals if consulted  - Perform Range of Motion  times a day.  - Reposition patient every  hours.  - Dangle patient  times a day  - Stand patient  times a day  - Ambulate patient  times a day  - Out of bed to chair  times a day   - Out of bed for meals  times a day  - Out of bed for toileting  - Record patient progress and toleration of activity level   Outcome: Progressing     Problem: DISCHARGE PLANNING  Goal: Discharge to home or other facility with appropriate resources  Description: INTERVENTIONS:  - Identify barriers to discharge w/patient and caregiver  - Arrange for needed discharge resources and transportation as appropriate  - Identify discharge learning needs (meds, wound care, etc.)  - Arrange for interpretive services to assist at discharge as needed  - Refer to Case Management Department for coordinating discharge planning if the patient needs post-hospital services based on physician/advanced practitioner order or complex needs related to functional status, cognitive ability, or social support system  Outcome: Progressing     Problem: POSTPARTUM  Goal: Experiences normal postpartum course  Description: INTERVENTIONS:  - Monitor maternal vital signs  - Assess uterine involution and lochia  Outcome: Progressing  Goal: Appropriate maternal -  bonding  Description: INTERVENTIONS:  - Identify family support  - Assess for appropriate maternal/infant bonding   -Encourage maternal/infant bonding opportunities  - Referral to  or  as needed  Outcome: Progressing  Goal: Establishment of infant feeding pattern  Description: INTERVENTIONS:  - Assess breast/bottle feeding  - Refer to lactation as needed  Outcome: Progressing  Goal: Incision(s), wounds(s) or drain site(s) healing without S/S of  infection  Description: INTERVENTIONS  - Assess and document dressing, incision, wound bed, drain sites and surrounding tissue  - Provide patient and family education  - Perform skin care/dressing changes every   Outcome: Progressing

## 2024-11-02 NOTE — ASSESSMENT & PLAN NOTE
QBL: 665cc; admission Hb 13.1g/dL -> 11.5g/dL. Patient progressing toward postoperative milestones.  - Continue routine postoperative care  - Pain management with oral analgesics  - DVT Ppx: SCDs; encourage ambulation  - Void trial passed  - Encourage breastfeeding, familial bonding

## 2024-11-04 NOTE — UTILIZATION REVIEW
"    MOTHER AND BABY DISCHARGED NOV     NOTIFICATION OF INPATIENT ADMISSION   MATERNITY/DELIVERY AUTHORIZATION REQUEST   SERVICING FACILITY:   [facility]  Parent Child Health - L&D, Banks, NICU  [address] [place] [address]  Tax ID: [id number]  NPI: [id number]   ATTENDING PROVIDER:  Attending Name and NPI#: Madie Gale Md [[id number]]  Address: [address] [place] [address]  Phone: [phone]   ADMISSION INFORMATION:  Place of Service: Inpatient [facility]  Place of Service Code: 21  Inpatient Admission Date/Time: 10/29/24  3:25 PM  Discharge Date/Time: 2024  1:25 PM  Admitting Diagnosis Code/Description:  41 weeks gestation of pregnancy [O48.0, Z3A.41]  Encounter for induction of labor [Z34.90]  Encounter for  delivery without indication [O82]     Mother: Va Lujan 1990 Estimated Date of Delivery: 10/20/24  Delivering clinician: Madie Gale   OB History          1    Para   1    Term   1            AB        Living   1         SAB        IAB        Ectopic        Multiple   0    Live Births   1                Name & MRN:   Information for the patient's :  Masood Lujan [[id number]]    Delivery Information:  Sex: male  Delivered 10/31/2024 1:50 AM by , Low Transverse; Gestational Age: 41w4d     Measurements:  Weight: 7 lb 10.9 oz (3485 g);  Height: 19.5\"    APGAR 1 minute 5 minutes 10 minutes   Totals: 2 9       UTILIZATION REVIEW CONTACT:  Caro Ruvalcaba, Utilization   Network Utilization Review Department  Phone: [phone]  Fax [phone]  Email: [email or web address]  Contact for approvals/pending authorizations, clinical reviews, and discharge.     PHYSICIAN ADVISORY SERVICES:  Medical Necessity Denial & Lrli-xp-Jfyq Review  Phone: [phone]  Fax: [phone]  Email: [email or web address]     DISCHARGE SUPPORT " TEAM:  For Patients Discharge Needs & Updates  Phone: [phone] opt. 2 Fax: [phone]  Email: [email or web address]     NOTIFICATION OF ADMISSION DISCHARGE   This is a Notification of Discharge from [facility]. Please be advised that this patient has been discharge from our facility. Below you will find the admission and discharge date and time including the patient’s disposition.   UTILIZATION REVIEW CONTACT:  Na Parker  Utilization   Network Utilization Review Department  Phone: [phone] x carefully listen to the prompts. All voicemails are confidential.  Email: [email or web address]     ADMISSION INFORMATION  PRESENTATION DATE: 10/29/2024  3:25 PM  OBERVATION ADMISSION DATE: N/A  INPATIENT ADMISSION DATE: 10/29/24  3:25 PM   DISCHARGE DATE: 11/2/2024  1:25 PM   DISPOSITION:Home/Self Care    Network Utilization Review Department  ATTENTION: Please call with any questions or concerns to [phone] and carefully listen to the prompts so that you are directed to the right person. All voicemails are confidential.   For Discharge needs, contact Care Management DC Support Team at [phone] opt. 2  Send all requests for admission clinical reviews, approved or denied determinations and any other requests to dedicated fax number below belonging to the campus where the patient is receiving treatment. List of dedicated fax numbers for the Facilities:  FACILITY NAME UR FAX NUMBER   ADMISSION DENIALS (Administrative/Medical Necessity) [phone]   DISCHARGE SUPPORT TEAM ([facility]) [phone]   PARENT CHILD HEALTH (Maternity/NICU/Pediatrics) [phone]   [facility] [phone]   [facility] [phone]   [facility] [phone]   [facility] [phone]   [facility] [phone]   [facility]  [place] [phone]   [facility] [phone]   [facility] [phone]   [facility] [phone]   [facility] [phone]   [facility] [phone]   [facility] [phone]          NOTIFICATION OF ADMISSION DISCHARGE   This is a Notification of Discharge from [facility]. Please be advised that this patient has been discharge from our facility. Below you will find the admission and discharge date and time including the patient’s disposition.   UTILIZATION REVIEW CONTACT:  Na Parker  Utilization   Network Utilization Review Department  Phone: [phone] x carefully listen to the prompts. All voicemails are confidential.  Email: [email or web address]     ADMISSION INFORMATION  PRESENTATION DATE: 10/29/2024  3:25 PM  OBERVATION ADMISSION DATE: N/A  INPATIENT ADMISSION DATE: 10/29/24  3:25 PM   DISCHARGE DATE: 11/2/2024  1:25 PM   DISPOSITION:Home/Self Care    Network Utilization Review Department  ATTENTION: Please call with any questions or concerns to [phone] and carefully listen to the prompts so that you are directed to the right person. All voicemails are confidential.   For Discharge needs, contact Care Management DC Support Team at [phone] opt. 2  Send all requests for admission clinical reviews, approved or denied determinations and any other requests to dedicated fax number below belonging to the campus where the patient is receiving treatment. List of dedicated fax numbers for the Facilities:  FACILITY NAME UR FAX NUMBER   ADMISSION DENIALS (Administrative/Medical Necessity) [phone]   DISCHARGE SUPPORT TEAM ([facility]) [phone]   PARENT CHILD HEALTH (Maternity/NICU/Pediatrics) [phone]   [facility] [phone]    [facility] [phone]   [facility] [phone]   [facility] [phone]   [facility] [phone]   [facility] [phone]   [facility] [phone]   [facility] [phone]   [facility] [phone]   [facility] [phone]   [facility] [phone]   [facility] [phone]

## 2024-11-05 ENCOUNTER — TELEPHONE (OUTPATIENT)
Age: 34
End: 2024-11-05

## 2024-11-05 NOTE — TELEPHONE ENCOUNTER
Pt called to schedule her postpartum visit. Delivered on 10/31 w/ Dr. Gale. Pt communicated no current concerns since delivery. No available appt's in [place]. Please F/U to assist with scheduling postpartum visit. Thank you.

## 2024-11-06 LAB — PLACENTA IN STORAGE: NORMAL

## 2024-11-08 ENCOUNTER — TELEPHONE (OUTPATIENT)
Age: 34
End: 2024-11-08

## 2024-11-08 ENCOUNTER — TELEPHONE (OUTPATIENT)
Dept: OBGYN CLINIC | Facility: CLINIC | Age: 34
End: 2024-11-08

## 2024-11-08 NOTE — TELEPHONE ENCOUNTER
----- Message from Gisel Roberts PA-C sent at 11/5/2024  6:29 AM EST -----  Please make sure this patient gets scheduled for a postpartum visit  ----- Message -----  From: Minoo Carreon  Sent: 11/4/2024   9:29 AM EST  To: Gisel Roberts PA-C    Received this message in error this is not our patient. Please advise  ----- Message -----  From: Paige Almonte  Sent: 11/4/2024   8:26 AM EST  To: [facility]er[facility]      ----- Message -----  From: Suzi Robert  Sent: 11/4/2024   8:20 AM EST  To: [facility]    Patient is being discharged from their inpatient hospitalization today. Patients is insured by "[employer] and requires a follow up TCM appointment within 7 days of discharge. Please contact this patient to schedule appointment.     Thank you    Inpatient Care Management

## 2024-11-11 ENCOUNTER — POSTPARTUM VISIT (OUTPATIENT)
Dept: OBGYN CLINIC | Facility: CLINIC | Age: 34
End: 2024-11-11

## 2024-11-11 VITALS — WEIGHT: 103 LBS | BODY MASS INDEX: 20.12 KG/M2 | DIASTOLIC BLOOD PRESSURE: 78 MMHG | SYSTOLIC BLOOD PRESSURE: 112 MMHG

## 2024-11-11 DIAGNOSIS — Z98.891 S/P PRIMARY LOW TRANSVERSE C-SECTION: Primary | Chronic | ICD-10-CM

## 2024-11-11 PROBLEM — O26.899 PREGNANCY RELATED NAUSEA, ANTEPARTUM: Status: RESOLVED | Noted: 2024-03-21 | Resolved: 2024-11-11

## 2024-11-11 PROBLEM — R11.0 PREGNANCY RELATED NAUSEA, ANTEPARTUM: Status: RESOLVED | Noted: 2024-03-21 | Resolved: 2024-11-11

## 2024-11-11 PROCEDURE — 99024 POSTOP FOLLOW-UP VISIT: CPT | Performed by: STUDENT IN AN ORGANIZED HEALTH CARE EDUCATION/TRAINING PROGRAM

## 2024-11-11 NOTE — PROGRESS NOTES
Pt is here for postpartum exam   No concerns at this time    Csection on 10/31  Baby Boy   7lbs 10.9oz  APGARS 2/9  Currently Breastfeeding  Slight Vaginal Bleeding     EPDS 1

## 2024-11-11 NOTE — PROGRESS NOTES
PP Incision Check     Va Lujan  1990    S:  34 y.o. female here for postpartum visit.  She is s/p  (maternal request) on 10/31/24.       Gender: male   Apgars: 2/9  Weight: 7#10.9oz  Her lochia has resolved.    She is Breastfeeding without problems.   She is eating normally, denies constipation, diarrhea, urinary dysfunction.   She denies postpartum blues/depression.  Her EPDS is 1.         O:   Wt 46.7 kg (103 lb)   LMP 2024 (Exact Date)   Breastfeeding Yes   BMI 20.12 kg/m²   She appears well and in no distress  Breasts are soft, nontender, without erythema  Abdomen is soft and nontender, incision c/d/i with glue in place  A/P:  Incision check. She will return at 3wk PP for routine PP visit

## 2024-11-13 ENCOUNTER — OFFICE VISIT (OUTPATIENT)
Dept: POSTPARTUM | Facility: CLINIC | Age: 34
End: 2024-11-13
Payer: COMMERCIAL

## 2024-11-13 VITALS — DIASTOLIC BLOOD PRESSURE: 70 MMHG | SYSTOLIC BLOOD PRESSURE: 92 MMHG | HEART RATE: 88 BPM

## 2024-11-13 PROCEDURE — 99404 PREV MED CNSL INDIV APPRX 60: CPT | Performed by: PEDIATRICS

## 2024-11-13 NOTE — PATIENT INSTRUCTIONS
"Continue to feed on demand (at least every 2-3 hours around the clock) working on achieving an optimal latch by positioning baby with ear, shoulder and hip in line, baby's arms open, not across chest/ in between mom and baby.  Starting with nose to nipple, hand at base if baby's head/neck infant up at chest level and starting to feed infant with nose arriving at the nipple. Then, using areolar compression to achieve a deep latch that is comfortable and exchanges optimum amounts of milk.      When baby slows at the breast you may offer gentle breast compressions to increase flow.  Offer both breasts with each feeding.  You may offer up to 4 breasts per feeding, or \"switch\" nursing: latch baby, when suckling slows offer gentle breast compressions, once no longer actively nursing on that breast burp to stimulate, then switch to the other side, repeat up to 2 more times as needed.       Pump and/or hand express if not feeding at the breast.  Milk should be removed at least every 3 hours during the day and at least once over night not going longer than 5hrs.    Add several milk removals in each day to increase your supply to decrease the formula supplementation.  Refer to the hands on pumping video and hand express after pumping.  You may cycle the pump from let down mode to expression once milk flow increases, once flow decreases you may go back to let down mode and go though the cycle again, up to 3 times in a pumping session.  Sessions should last no longer than 20 min. Check flange sizes and consider adjusting if needed.  The nipple should move freely in and out of the flange comfortably.  Adjust the settings on the pump as needed, higher suction does not equal more milk, comfort is important to promote milk flow.    18mm flanges may be a better fit.    When giving bottles be sure to do so by paced bottle feeding with a slow flow nipple, refer to the hand out and IABLE video.      Have a weight check by Friday or " Saturday for Masood.    Follow up with lactation in 1-2 weeks.    Call with any questions or concerns.

## 2024-11-13 NOTE — PROGRESS NOTES
INITIAL BREAST FEEDING EVALUATION    Informant/Relationship: Va    Discussion of General Lactation Issues: Annia states that Masood has a good latch but it can take a while to achieve a latch, she states that he falls asleep a lot at the breast.  FOB states Masood often feeds up to 30-35 min on each breast then takes formula after.    Infant is 13 days old today.        History:  Fertility Problem:no  Breast changes:yes - larger breasts and nipples and areolas, darker nipples   : no, not progressing past 6cm  Full term:yes - 41.4   labor:no  First nursing/attempt < 1 hour after birth:yes - not sure how well he fed  Skin to skin following delivery:yes - in PACU  Breast changes after delivery:yes - none, milk I day 1 per mom   Rooming in (infant in room with mother with exception of procedures, eg. Circumcision: no  Blood sugar issues:no  NICU stay:no  Jaundice:no  Phototherapy:no  Supplement given: (list supplement and method used as well as reason(s):yes - formula on day 1 per moms choice, did not attempt to latch     Past Medical History:   Diagnosis Date    Varicella     had chickenpox         Current Outpatient Medications:     Prenatal MV-Min-Fe Fum-FA-DHA (PRENATAL 1 PO), Take by mouth (Patient not taking: Reported on 2024), Disp: , Rfl:     No Known Allergies    Social History     Substance and Sexual Activity   Drug Use Never    Comment: pt denies, - denies, denies family use       Social History     Interval Breastfeeding History:    Frequency of breast feedin-2 times a day   Does mother feel breastfeeding is effective: If no, explain: it takes a long time and masood is still hungry after   Does infant appear satisfied after nursing:No  Stooling pattern normal: Yes  Urinating frequently:Yes  Using shield or shells: No    Alternative/Artificial Feedings:   Bottle: Dr. Hawk, NOT paced feeding at all feedings   Cup: No  Syringe/Finger: No           Formula Type:  Similac                     Amount: 3-4oz            Breast Milk:                      Amount: 3-4oz   Sometimes a mix of both             Frequency Q q2-3 Hr between feedings  Elimination Problems: No      Equipment:              Type: Lansinoh hand pump, motiff             Frequency of Use: only using the hand pump.  Pumping 3-4 times a day yields 1-2 oz      Equipment Problems: no    Mom:  Breast: Normal, small, round, soft, close spaced  Nipple Assessment in General: Normal: elongated/eraser, no discoloration and no damage noted.  Mother's Awareness of Feeding Cues                 Recognizes: Yes                  Verbalizes: Yes  Support System: FOB, extended family   History of Breastfeeding: none   Changes/Stressors/Violence: none, not alone for DV  Concerns/Goals: to feed efficiently at the breast     Problems with Mom: infrequent milk removals    Physical Exam  Constitutional:       Appearance: Normal appearance.   HENT:      Head: Normocephalic and atraumatic.   Cardiovascular:      Rate and Rhythm: Normal rate and regular rhythm.      Pulses: Normal pulses.      Heart sounds: Normal heart sounds.   Pulmonary:      Effort: Pulmonary effort is normal.      Breath sounds: Normal breath sounds.   Musculoskeletal:         General: Normal range of motion.      Cervical back: Normal range of motion and neck supple.   Neurological:      General: No focal deficit present.      Mental Status: She is alert and oriented to person, place, and time.   Skin:     General: Skin is warm and dry.   Psychiatric:         Mood and Affect: Mood normal.         Behavior: Behavior normal.         Thought Content: Thought content normal.         Judgment: Judgment normal.         Infant:  Behaviors: Alert  Color: Pink  Birth weight: 3485g  Current weight: 3645g    Problems with infant: slow weight gain      General Appearance:  Alert, active, no distress                             Head:  Normocephalic, AFOF, sutures opposed                              Eyes:  Conjunctiva clear, no drainage                              Ears:  Normally placed, no anomolies                             Nose:  Septum intact, no drainage or erythema                           Mouth:  No lesions, tongue presents with a slight v shape, extends to lower lip, lateralizes with body, complete spread of tongue, periods of firm cup and good peristalsis but not maintained, periods of moderate cupping and some periods of snap back.                     Neck:  Supple, symmetrical                 Respiratory:  No grunting, flaring, retractions, breath sounds clear and equal            Cardiovascular:  Regular rate and rhythm. No murmur. Adequate perfusion/capillary refill. Femoral pulse present                    Abdomen:   Soft, non-tender, no masses, bowel sounds present             Genitourinary:  Normal male,  no discharge, swelling, or pain, anus patent                          Spine:   No abnormalities noted        Musculoskeletal:  Full range of motion          Skin/Hair/Nails:   Skin warm, dry, and intact, no rashes or abnormal dyspigmentation or lesions                Neurologic:   No abnormal movement, tone appropriate for gestational age     Latch:  Efficiency:               Lips Flanged: Yes              Depth of latch: shallow initially, moderate with re-latch               Audible Swallow: Yes              Visible Milk: Yes              Wide Open/ Asymmetrical: Yes              Suck Swallow Cycle: Breathing: unlabored , Coordinated: yes  Nipple Assessment after latch: slightly slanted  Latch Problems: initially latch is shallow, improved with improved positioning and re-latch    Position:  Infant's Ergonomics/Body               Body Alignment: Yes, after education               Head Supported: Yes               Close to Mom's body/ Lifted/ Supported: Yes, after education               Mom's Ergonomics/Body: Yes                           Supported: Yes          "                  Sitting Back: Yes                           Brings Baby to her breast: Yes  Positioning Problems: initially baby is turned away from mom's body, she does not support or sandwich her breast, leading to a shallow latch       Handouts:   Paced bottle feeding, Hands on pumping, Hand expression, Latch Check List, and breast compressions     Education:  Reviewed Latch and positioning: Worked on positioning infant up at chest level and starting to feed infant with nose arriving at the nipple. Then, using areolar compression to achieve a deep latch that is comfortable and exchanges optimum amounts of milk. I offered suggestions on positioning, for a more optimal latch, showed mom proper positioning, ear, shoulder hip in line, baby's arms open, not in between mom and baby, nose to nipple, hand at base of head/neck.  Reviewed Frequency/Supply & Demand: Continue to feed on demand, at least every 2-3 hours around the clock.  Pump and or hand express if not feeding at the breast. Milk should be removed at least every 3 hours during the day and at least once over night not going longer than 5hrs.  When baby slows at the breast you may offer gentle breast compressions to increase flow.  Offer both breasts with each feeding.  You may offer up to 4 breasts per feeding, or \"switch\" nursing: latch baby, when suckling slows offer gentle breast compressions, once no longer actively nursing on that breast burp to stimulate, then switch to the other side, repeat up to 2 more times as needed.       How to break a latch with clean finger.  How to differentiate between nutritive and non-nutritive suck.    Reviewed Infant:Cues and varied States of Awareness  Reviewed Infant Elimination: yes  Reviewed Alternative/Artificial Feedings: paced bottle feeding with a slow flow nipple   Reviewed Mom/Breast care: Discussed appropriate handling and care of the breast to maintain their integrity.  Reviewed Equipment: Add several milk " removals in each day to increase your supply to decrease the formula supplementation.  Refer to the hands on pumping video and hand express after pumping.  You may cycle the pump from let down mode to expression once milk flow increases, once flow decreases you may go back to let down mode and go though the cycle again, up to 3 times in a pumping session.  Sessions should last no longer than 20 min. Check flange sizes and consider adjusting if needed.  The nipple should move freely in and out of the flange comfortably.  Adjust the settings on the pump as needed, higher suction does not equal more milk, comfort is important to promote milk flow.  Discussed appropriate handling and care of the breast to maintain their integrity.Discussed appropriate handling and care of the breast to maintain their integrity.      Plan:  Continue to feed on demand at least q2-3hrs around the clock, working on optimal latch and positioning, offering breast compressions and switch nursing as needed.     Pump and or hand express if not feeding at the breast. Milk should be removed at least every 3 hours during the day and at least once over night not going longer than 5hrs.    Weight check by Friday or Saturday for Masood .    Follow up with lactation in 1-2 weeks.    I have spent 80 minutes with Patient and family today in which greater than 50% of this time was spent in counseling/coordination of care regarding Patient and family education.

## 2024-11-19 ENCOUNTER — TELEPHONE (OUTPATIENT)
Dept: OBGYN CLINIC | Facility: CLINIC | Age: 34
End: 2024-11-19

## 2024-11-19 NOTE — TELEPHONE ENCOUNTER
----- Message from Aleja S sent at 3/28/2024  9:50 AM EDT -----  Regarding:PP call  24  POSTPARTUM PHONE CALL ASSESSMENT    Date of Delivery: 10/31/24  Delivering Provider: britt  Mode:    Delivery Notes/Complications: no- failure to progress   Do you still have bleeding/pain? If so, how much/how severe? Sm amt blding,    Regular BMs/Urination? yes  Breastfeeding/Formula/Both? yes  How are you doing emotionally? Feels good   If struggling, obtain a EPDS Score: no  Do you have any other questions or concerns for us or your provider? yes  Have you scheduled the pediatrician appointment with pediatrician? yes  Do you have a postpartum visit scheduled? yes   Date scheduled: 24 Provider: Britt

## 2024-11-21 ENCOUNTER — POSTPARTUM VISIT (OUTPATIENT)
Dept: OBGYN CLINIC | Facility: CLINIC | Age: 34
End: 2024-11-21

## 2024-11-21 VITALS — DIASTOLIC BLOOD PRESSURE: 60 MMHG | BODY MASS INDEX: 19.92 KG/M2 | SYSTOLIC BLOOD PRESSURE: 100 MMHG | WEIGHT: 102 LBS

## 2024-11-21 PROCEDURE — 99024 POSTOP FOLLOW-UP VISIT: CPT | Performed by: OBSTETRICS & GYNECOLOGY

## 2024-11-21 NOTE — PROGRESS NOTES
Va Lujan  1990    S:  34 y.o. female here for postpartum visit.  She is s/p  (maternal request during late-term IOL) on 10/31/2024.   Gender: male   Apgars: 2/9  Weight: 7 lbs 11 oz  Her lochia has not yet resolved but is very light.  She is Breastfeeding without problems.   Her pregnancy was complicated by: late-term pregnancy.  She denies postpartum blues/depression.  Dekalb score was 1.    We discussed contraceptive options in detail including birth control pills, patches, NuvaRing, DepoProvera, Mirena/Kyleena IUD, Nexplanon in detail.  At this point she would like condoms and may consider Mirena.  Brochure given.    O:   She appears well and in no distress  Abdomen is soft and nontender  Pfannenstiel well-healed; glue still present in places - some removed and discussed how to do rest with patient     A/P:  Postpartum visit.   She will return in 2 months for her yearly exam, sooner PRN.   Referral to postpartum PT given.

## 2024-11-22 NOTE — PROGRESS NOTES
I have reviewed the notes, assessments, and/or procedures performed by Daisy Ocasio RN, IBCLC, I concur with her/his documentation of Va Vásquez MD 11/22/24

## 2025-01-17 ENCOUNTER — EVALUATION (OUTPATIENT)
Dept: PHYSICAL THERAPY | Facility: REHABILITATION | Age: 35
End: 2025-01-17
Payer: COMMERCIAL

## 2025-01-17 DIAGNOSIS — R19.8 ABDOMINAL WEAKNESS: Primary | ICD-10-CM

## 2025-01-17 PROCEDURE — 97110 THERAPEUTIC EXERCISES: CPT

## 2025-01-17 PROCEDURE — 97162 PT EVAL MOD COMPLEX 30 MIN: CPT

## 2025-01-17 NOTE — PROGRESS NOTES
PT Evaluation     Today's date: 2025  Patient name: Va Lujan  : 1990  MRN: [id number]  Referring provider: Ember Steele MD  Dx:   Encounter Diagnosis     ICD-10-CM    1. Abdominal weakness  R19.8       2. Postpartum exam  Z39.2 Ambulatory Referral to Physical Therapy          Start Time: 1330  Stop Time: 1430  Total time in clinic (min): 60 minutes    Assessment    Assessment details: Va Lujan is a 34 y.o.  PP female being seen for IE of PT for PP recovery care. Patient's current deficits include: mild abdominal weakness, mild diastasis, mild scar mobility deficits, and mild functional all consistent w/ the PP state. These impairments contribute to the following functional limitations: decreased tolerance to extended walking, extended standing, exercise, and running tolerance. Va Lujan is a good candidate for physical therapy and would benefit from skilled physical therapy to guide progressive interventions to address the above impairments in order to allow the patient to achieve the goals listed and return to prior level of function. POC: functional strengthening, abdominal strengthening, and return to run protocol.    During initial evaluation, education was provided on anatomy and function of the pelvic floor muscles and provided written and verbal consent for pelvic floor muscle exam. Patient also educated on diagnosis, plan of care and prognosis. Pt is in agreement with recommended plan of care and goals for therapy, and demonstrates motivation for active participation in proposed plan of care.      Understanding of Dx/Px/POC: good     Prognosis: good    Goals  Function:  In 8 weeks, pt will be able to walk/jog for 15 min w/ no pain and proper.   In 4 weeks, pt will be able to complete full return to run protocol to indicate readiness for high impact activities.   In 4 weeks, pt will be able to perform 30 min of exercise w/ proper breathing mechanics and TA  "activation to indicate proper progression for healing of abdominal wall.   In 8 weeks, pt will be able to perform 1 min plank or longer w/ no valsalva and proper core tensioning to indicate improved abdominal strength.           Plan    Frequency: 1x week  Duration in weeks: 8  Plan of Care beginning date: 2025  Plan of Care expiration date: 3/14/2025        Subjective    Chief Complaint: PP Care  HPI: Va Lujan is a 34 y.o.  female who presents 11 weeks  s/p C Section. Baby Masood was born at 41 weeks gestation and weighed 7lb 10oz. Pt feels she has been recovering well and presents today w/ goal to initiate safe exercise.   Denies bladder dysfunction and bowel dysfunction. Uses squatty potty and has been practicing breathing mechanics during bowel evacuation. Has not resumed exercise yet, awaiting clearance. Goal to return to piliates and running when able.        Urinary:     Fluid intake: Water 40 oz,   Denies: PATRICK , UUI, dysuria, nocturia, and nocturnal enuresis    Bowel:     Evacuates regularly .Uses squatty potty.   Denies bleeding, fecal smearing, and painful evacuation   GYN:      with  delivery; Breastfeeding and supplementing formula, was going well, how  Menstruation: Not yet  Lochia: No   Sexual Function:     Sexually Active: Not yet   MSK:      Current exercise: Not yet, awaiting PT. Getting reformer for home, running,   Pain:      Abdominal pain  \"Mild soreness\", on and off, Worst 3/10, Best 0/10, Current 0/10.   Notices it most when waking in the middle of the night.   Side lying sleeping.    Goals:     Return to PLOF   Return to exercise   Increase abdominal strength        Objective       Abdominal Assessment:      Abdominal Assessment: Mild TTP in RLQ, reproduced pt's pain, TA contraction w/ palpation improved pain   Curl up: 3cm width x 9cm length x 0cm height x 0.5 cm; mild bogginess, firm w/ activation       Diastatis   Diastasis recti present: yes                "     Precautions: PP  Patient Active Problem List   Diagnosis    S/P primary low transverse     SEPIDEH (acute kidney injury) (HCC)         Diagnosis:    POC expires (Date that your POC expires) Auth Status? (BOMN, approved, pending) Unit limit (Daily) Auth Start date Expiration date PT/OT + Visit Limit?   3/14/2025           Date of Service         Visits Used 1        Visits Remaining         [employer] Created                  Neuro Re-Ed         Urge deferral         Defecation mechanics         Breathing Mechanics         PFM Coordination         PFM Down Training         Internal Cueing          Biofeedback                  Ther Ex         PFM Strengthening         Hip strengthening         Functional Strengthening  ** return to run        Abdominal Strengthening Forward rock backs x20 3 sec hold     Bridges x20     Bicep curls #8 x20     SA Rows #8 x20     Hook lying TA activation x5     Standing TA activation x5     CS x20  **       Aerobic  Walk/jog**       Therapeutic Rest Breaks         Mobility          High Impact         UE Strengthening                   Ther Activity         Voiding Diaries         Review of sxs         Fluid Intake         Ergonomics                  Manual Ther         PFM exam         Ortho exam         Dynamometer Testing         Fascial Decompression         Bowel Massage                  Modalities                           Outcome Measure

## 2025-02-04 ENCOUNTER — TELEPHONE (OUTPATIENT)
Dept: PHYSICAL THERAPY | Facility: REHABILITATION | Age: 35
End: 2025-02-04

## 2025-02-04 NOTE — TELEPHONE ENCOUNTER
Spoke to patient on phone, feels equipped to continue exercise at home, but feels confident to reach out if needed. Discharge at this time.

## (undated) DEVICE — SKIN MARKER DUAL TIP WITH RULER CAP, FLEXIBLE RULER AND LABELS: Brand: DEVON

## (undated) DEVICE — SUT VICRYL 0 CTX 36 IN J978H

## (undated) DEVICE — PACK C-SECTION PBDS

## (undated) DEVICE — SUT MONOCRYL 0 CTX 36 IN Y398H

## (undated) DEVICE — ABDOMINAL PAD: Brand: DERMACEA

## (undated) DEVICE — SUT VICRYL 0 CT-1 36 IN J946H

## (undated) DEVICE — EXOFIN PRECISION PEN HIGH VISCOSITY TOPICAL SKIN ADHESIVE: Brand: EXOFIN PRECISION PEN, 1G

## (undated) DEVICE — CHLORAPREP HI-LITE 26ML ORANGE

## (undated) DEVICE — GLOVE PI ULTRA TOUCH SZ 6

## (undated) DEVICE — Device

## (undated) DEVICE — SPONGE LAP 18 X 18 IN STRL RFD

## (undated) DEVICE — TELFA NON-ADHERENT ABSORBENT DRESSING: Brand: TELFA

## (undated) DEVICE — GAUZE SPONGES,16 PLY: Brand: CURITY

## (undated) DEVICE — SUT MONOCRYL 4-0 PS-2 27 IN Y426H

## (undated) DEVICE — SUT MONOCRYL 2-0 CT-1 36 IN Y945H

## (undated) DEVICE — DRESSING TELFA 2 X 3 IN STRL

## (undated) DEVICE — SURGICEL NU-KNIT 3 X 4